# Patient Record
Sex: MALE | Race: WHITE
[De-identification: names, ages, dates, MRNs, and addresses within clinical notes are randomized per-mention and may not be internally consistent; named-entity substitution may affect disease eponyms.]

---

## 2019-09-04 ENCOUNTER — HOSPITAL ENCOUNTER (EMERGENCY)
Dept: HOSPITAL 94 - ER | Age: 64
LOS: 1 days | Discharge: PSYCHIATRIC OR SUBSTANCE USE FACILITY | End: 2019-09-05
Payer: MEDICARE

## 2019-09-04 VITALS — BODY MASS INDEX: 22.78 KG/M2 | WEIGHT: 150.31 LBS | HEIGHT: 68 IN

## 2019-09-04 DIAGNOSIS — F20.9: ICD-10-CM

## 2019-09-04 DIAGNOSIS — E86.0: ICD-10-CM

## 2019-09-04 DIAGNOSIS — Z79.82: ICD-10-CM

## 2019-09-04 DIAGNOSIS — Z79.4: ICD-10-CM

## 2019-09-04 DIAGNOSIS — R62.7: Primary | ICD-10-CM

## 2019-09-04 DIAGNOSIS — F79: ICD-10-CM

## 2019-09-04 DIAGNOSIS — J44.9: ICD-10-CM

## 2019-09-04 DIAGNOSIS — Z79.899: ICD-10-CM

## 2019-09-04 DIAGNOSIS — E11.9: ICD-10-CM

## 2019-09-04 LAB
ALBUMIN SERPL BCP-MCNC: 2.7 G/DL (ref 3.4–5)
ALBUMIN/GLOB SERPL: 0.9 {RATIO} (ref 1.1–1.5)
ALP SERPL-CCNC: 100 IU/L (ref 46–116)
ALT SERPL W P-5'-P-CCNC: 78 U/L (ref 12–78)
ANION GAP SERPL CALCULATED.3IONS-SCNC: 6 MMOL/L (ref 8–16)
AST SERPL W P-5'-P-CCNC: 69 U/L (ref 10–37)
BASOPHILS # BLD AUTO: 0.1 X10'3 (ref 0–0.2)
BASOPHILS NFR BLD AUTO: 0.9 % (ref 0–1)
BILIRUB SERPL-MCNC: 0.5 MG/DL (ref 0.1–1)
BUN SERPL-MCNC: 25 MG/DL (ref 7–18)
BUN/CREAT SERPL: 26.3 (ref 5.4–32)
CALCIUM SERPL-MCNC: 8.1 MG/DL (ref 8.5–10.1)
CHLORIDE SERPL-SCNC: 110 MMOL/L (ref 99–107)
CO2 SERPL-SCNC: 27.8 MMOL/L (ref 24–32)
CREAT SERPL-MCNC: 0.95 MG/DL (ref 0.6–1.1)
EOSINOPHIL # BLD AUTO: 0.2 X10'3 (ref 0–0.9)
EOSINOPHIL NFR BLD AUTO: 3.3 % (ref 0–6)
ERYTHROCYTE [DISTWIDTH] IN BLOOD BY AUTOMATED COUNT: 15.4 % (ref 11.5–14.5)
ETHANOL SERPL-MCNC: < 0.01 GM/DL (ref 0–0.01)
GFR SERPL CREATININE-BSD FRML MDRD: 80 ML/MIN
GLUCOSE SERPL-MCNC: 254 MG/DL (ref 70–104)
HCT VFR BLD AUTO: 47.7 % (ref 42–52)
HGB BLD-MCNC: 15.6 G/DL (ref 14–17.9)
LYMPHOCYTES # BLD AUTO: 1.7 X10'3 (ref 1.1–4.8)
LYMPHOCYTES NFR BLD AUTO: 23.9 % (ref 21–51)
MCH RBC QN AUTO: 28.6 PG (ref 27–31)
MCHC RBC AUTO-ENTMCNC: 32.7 G/DL (ref 33–36.5)
MCV RBC AUTO: 87.7 FL (ref 78–98)
MONOCYTES # BLD AUTO: 0.6 X10'3 (ref 0–0.9)
MONOCYTES NFR BLD AUTO: 8.1 % (ref 2–12)
NEUTROPHILS # BLD AUTO: 4.7 X10'3 (ref 1.8–7.7)
NEUTROPHILS NFR BLD AUTO: 63.8 % (ref 42–75)
PLATELET # BLD AUTO: 145 X10'3 (ref 140–440)
PMV BLD AUTO: 9.5 FL (ref 7.4–10.4)
POTASSIUM SERPL-SCNC: 4.7 MMOL/L (ref 3.5–5.1)
PROT SERPL-MCNC: 5.8 G/DL (ref 6.4–8.2)
RBC # BLD AUTO: 5.44 X10'6 (ref 4.7–6.1)
SODIUM SERPL-SCNC: 144 MMOL/L (ref 135–145)
WBC # BLD AUTO: 7.3 X10'3 (ref 4.5–11)

## 2019-09-04 PROCEDURE — 80053 COMPREHEN METABOLIC PANEL: CPT

## 2019-09-04 PROCEDURE — 96372 THER/PROPH/DIAG INJ SC/IM: CPT

## 2019-09-04 PROCEDURE — 85025 COMPLETE CBC W/AUTO DIFF WBC: CPT

## 2019-09-04 PROCEDURE — 99284 EMERGENCY DEPT VISIT MOD MDM: CPT

## 2019-09-04 PROCEDURE — 80320 DRUG SCREEN QUANTALCOHOLS: CPT

## 2019-09-04 PROCEDURE — 71045 X-RAY EXAM CHEST 1 VIEW: CPT

## 2019-09-04 PROCEDURE — 36415 COLL VENOUS BLD VENIPUNCTURE: CPT

## 2019-09-04 PROCEDURE — 82948 REAGENT STRIP/BLOOD GLUCOSE: CPT

## 2019-09-04 PROCEDURE — 96360 HYDRATION IV INFUSION INIT: CPT

## 2019-09-04 PROCEDURE — 84443 ASSAY THYROID STIM HORMONE: CPT

## 2019-09-05 ENCOUNTER — HOSPITAL ENCOUNTER (INPATIENT)
Dept: HOSPITAL 94 - ADULT MH | Age: 64
LOS: 28 days | Discharge: HOME | DRG: 885 | End: 2019-10-03
Attending: PSYCHIATRY & NEUROLOGY | Admitting: PSYCHIATRY & NEUROLOGY
Payer: MEDICARE

## 2019-09-05 VITALS — DIASTOLIC BLOOD PRESSURE: 69 MMHG | SYSTOLIC BLOOD PRESSURE: 160 MMHG

## 2019-09-05 VITALS — DIASTOLIC BLOOD PRESSURE: 67 MMHG | SYSTOLIC BLOOD PRESSURE: 146 MMHG

## 2019-09-05 VITALS — BODY MASS INDEX: 20.21 KG/M2 | HEIGHT: 68 IN | WEIGHT: 133.38 LBS

## 2019-09-05 VITALS — SYSTOLIC BLOOD PRESSURE: 135 MMHG | DIASTOLIC BLOOD PRESSURE: 67 MMHG

## 2019-09-05 DIAGNOSIS — I10: ICD-10-CM

## 2019-09-05 DIAGNOSIS — N40.0: ICD-10-CM

## 2019-09-05 DIAGNOSIS — L89.159: ICD-10-CM

## 2019-09-05 DIAGNOSIS — Z82.3: ICD-10-CM

## 2019-09-05 DIAGNOSIS — E78.5: ICD-10-CM

## 2019-09-05 DIAGNOSIS — L89.899: ICD-10-CM

## 2019-09-05 DIAGNOSIS — Z91.14: ICD-10-CM

## 2019-09-05 DIAGNOSIS — Z82.5: ICD-10-CM

## 2019-09-05 DIAGNOSIS — Z86.14: ICD-10-CM

## 2019-09-05 DIAGNOSIS — D64.9: ICD-10-CM

## 2019-09-05 DIAGNOSIS — K21.9: ICD-10-CM

## 2019-09-05 DIAGNOSIS — Z79.82: ICD-10-CM

## 2019-09-05 DIAGNOSIS — J32.9: ICD-10-CM

## 2019-09-05 DIAGNOSIS — E44.0: ICD-10-CM

## 2019-09-05 DIAGNOSIS — Z80.9: ICD-10-CM

## 2019-09-05 DIAGNOSIS — Z79.4: ICD-10-CM

## 2019-09-05 DIAGNOSIS — Z79.899: ICD-10-CM

## 2019-09-05 DIAGNOSIS — E11.9: ICD-10-CM

## 2019-09-05 DIAGNOSIS — I25.10: ICD-10-CM

## 2019-09-05 DIAGNOSIS — F33.3: Primary | ICD-10-CM

## 2019-09-05 PROCEDURE — 70551 MRI BRAIN STEM W/O DYE: CPT

## 2019-09-05 PROCEDURE — 85025 COMPLETE CBC W/AUTO DIFF WBC: CPT

## 2019-09-05 PROCEDURE — 80320 DRUG SCREEN QUANTALCOHOLS: CPT

## 2019-09-05 PROCEDURE — 80053 COMPREHEN METABOLIC PANEL: CPT

## 2019-09-05 PROCEDURE — 97530 THERAPEUTIC ACTIVITIES: CPT

## 2019-09-05 PROCEDURE — 97161 PT EVAL LOW COMPLEX 20 MIN: CPT

## 2019-09-05 PROCEDURE — 83036 HEMOGLOBIN GLYCOSYLATED A1C: CPT

## 2019-09-05 PROCEDURE — 71046 X-RAY EXAM CHEST 2 VIEWS: CPT

## 2019-09-05 PROCEDURE — 36415 COLL VENOUS BLD VENIPUNCTURE: CPT

## 2019-09-05 PROCEDURE — 71045 X-RAY EXAM CHEST 1 VIEW: CPT

## 2019-09-05 PROCEDURE — 82948 REAGENT STRIP/BLOOD GLUCOSE: CPT

## 2019-09-05 PROCEDURE — 99285 EMERGENCY DEPT VISIT HI MDM: CPT

## 2019-09-05 PROCEDURE — 87081 CULTURE SCREEN ONLY: CPT

## 2019-09-05 PROCEDURE — 97116 GAIT TRAINING THERAPY: CPT

## 2019-09-05 PROCEDURE — 92508 TX SP LANG VOICE COMM GROUP: CPT

## 2019-09-05 PROCEDURE — 92616 FEES W/LARYNGEAL SENSE TEST: CPT

## 2019-09-05 PROCEDURE — 80061 LIPID PANEL: CPT

## 2019-09-05 PROCEDURE — 84443 ASSAY THYROID STIM HORMONE: CPT

## 2019-09-05 RX ADMIN — INSULIN LISPRO SCH UNITS: 100 INJECTION, SOLUTION INTRAVENOUS; SUBCUTANEOUS at 15:05

## 2019-09-05 RX ADMIN — INSULIN LISPRO SCH UNITS: 100 INJECTION, SOLUTION INTRAVENOUS; SUBCUTANEOUS at 21:21

## 2019-09-05 RX ADMIN — INSULIN GLARGINE SCH UNIT: 100 INJECTION, SOLUTION SUBCUTANEOUS at 21:26

## 2019-09-05 NOTE — NUR
Pt is non contributory with assessment. Pt is cooperative and responds to command, but does 
not answer questions and just looks blankly at RN.

-------------------------------------------------------------------------------

Addendum: 09/05/19 at 2314 by Cristela Hendrix RN

-------------------------------------------------------------------------------

Amended: Links added.

## 2019-09-05 NOTE — NUR
Patient brought over from Main ER Bed 8 to Overflow Bed 24, via stretcher. 
Assisted into bed from stretcher, changed into green gowns, patient incontinent 
and cleaned with warm wipes.

Non-verbal during this time.

## 2019-09-05 NOTE — NUR
Admission note: 



 Pt admitted voluntarily at 1135 for psychosis from the emergency room. Pt has been unable 
to feed himself, or shower self, and not taking his medications or speaking or answering 
questions. Pt was at [facility] with pneumonia. They were unable to get pt on 5150. Pts 
caregivers brought pt here for help with his schizophrenia.  [facility] called and 
will pick pt up when he is discharged. Call Glenis [phone] first and Sonam [phone] 
second. PT HAS HISTORY OF SCHIZOPHRENIA AND DIABETES

## 2019-09-05 NOTE — NUR
Yris Underwood RN, Director of [facility], here to 
evaluate patient for in-patient care.

## 2019-09-05 NOTE — NUR
Patient discharged from Bed 24 in the Overflow area to be admitted to [facility]. Given all personal possessions and transported 
via wheelchair accompanied by Security and staff [place]

## 2019-09-05 NOTE — NUR
Breakfast tray here. Patient fed his meal as he presents as incapable of caring 
for himself. 

Ate 75% of his meal. No facial expression noted throughtout this time.

## 2019-09-05 NOTE — NUR
Nurse note:

   Pt has a sister that is POA for Medical and is in [facility] until Sept 13. Pt was able to 
sign his paperwork.  Pts sister Natalee states he is allowed to sign when he is capable to. 
Pts family understands he goes through these "Spells" and the reason is undetermined. Family 
is aware pt is here and being treated.

## 2019-09-05 NOTE — NUR
[facility] accepted [facility] at [facility] ([facility]). Spencer cornejo 
from [facility] here to have patient sign voluntary forms for admission. Patient 
verbalized understanding of admission to [facility]. He has been there before and 
recognized staff Spencer by name.

## 2019-09-06 VITALS — SYSTOLIC BLOOD PRESSURE: 139 MMHG | DIASTOLIC BLOOD PRESSURE: 58 MMHG

## 2019-09-06 VITALS — DIASTOLIC BLOOD PRESSURE: 63 MMHG | SYSTOLIC BLOOD PRESSURE: 149 MMHG

## 2019-09-06 LAB
CHOLEST SERPL-MCNC: 87 MG/DL (ref 0–200)
CHOLEST/HDLC SERPL: 2.7 {RATIO} (ref 0–4.99)
HBA1C MFR BLD: 9.1 % (ref 4.5–6.2)
HDLC SERPL-MCNC: 32 MG/DL (ref 35–60)
LDLC SERPL DIRECT ASSAY-MCNC: 45 MG/DL (ref 50–100)
TRIGL SERPL-MCNC: 64 MG/DL (ref 20–135)

## 2019-09-06 RX ADMIN — Medication SCH MG: at 07:48

## 2019-09-06 RX ADMIN — INSULIN GLARGINE SCH UNIT: 100 INJECTION, SOLUTION SUBCUTANEOUS at 21:33

## 2019-09-06 RX ADMIN — INSULIN LISPRO SCH UNITS: 100 INJECTION, SOLUTION INTRAVENOUS; SUBCUTANEOUS at 18:30

## 2019-09-06 RX ADMIN — INSULIN LISPRO SCH UNITS: 100 INJECTION, SOLUTION INTRAVENOUS; SUBCUTANEOUS at 09:09

## 2019-09-06 RX ADMIN — Medication SCH MCG: at 07:47

## 2019-09-06 RX ADMIN — THERA TABS SCH EACH: TAB at 07:46

## 2019-09-06 RX ADMIN — PANTOPRAZOLE SODIUM SCH MG: 40 TABLET, DELAYED RELEASE ORAL at 07:47

## 2019-09-06 RX ADMIN — INSULIN LISPRO SCH UNITS: 100 INJECTION, SOLUTION INTRAVENOUS; SUBCUTANEOUS at 14:22

## 2019-09-06 NOTE — NUR
DM consult: Pt with hx T1DM current A1c 9.1. Per H&P pt with a decline in 

self care x 1 week by not eating, drinking, or taking medications per rx. 

Per physical assessment pt is A/O x 2 and nonverbal. Pt with hx catatonic 

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 Pt currently 

not appropriate for DM education at this time. Pt on CHO controlled mech 

soft/chop all diet with 50% PO intake first meal however now up to 100% 

meeting nutrient needs. LBM 9/5. No edema or wounds. Will continue to 

follow.

Recommendations:

1) Continue mech soft/chop all CHO controlled diet

2) DM education prior to discharge IF appropriate

3) Routine bowel care

4) Weekly wt

-------------------------------------------------------------------------------

Addendum: 09/06/19 at 1509 by Jenn Sellers RD

-------------------------------------------------------------------------------

Amended: Links added.

## 2019-09-06 NOTE — NUR
NURSING PROGRESS REPORT:

Legal hold: Voluntary

Client on voluntary status for GD.

Report received from PITO Gabriel with use of SBAR.

Why are they here:  Pt. admitted for a mental health evaluation. His staff of the [facility] residence he lives at in [place] stated Pt. has been refusing to care for himself 
for the past week. According to staff, Pt. has not been eating or drinking, and has not been 
taking his medications. Per staff, Pt. has schizophrenia and has gone into catatonic states 
in the past associated with his diabetes. Pt. was seen at [facility] prior to this 
admission and prescribed antibiotics for possible pneumonia. 

Assessment

What has happened this shift: 

Pt. sleeping at start of shift. Pt. woken up for blood glucose test. Pt.'s CBG were , 
Noon 345, and dinner 253. Pt. is level 6 hyperglycemia protocol. 1:1 done at bedside. Pt. is 
developmentally delayed. Pt. cooperative but gives minimal responses. Pt. took all 
medications and ate all meals in community room. Pt. needs much prompting for meals and 
sometimes requires staff to feed him. Pt. is incontinent and needs assistance toileting. 

S/I, H/I: unable to assess

A/VH:  Unable to assess

Sleep: Pt. napped x2 today. 

ADL's: Needs assistance. Pt uses a FWW, incontinent of stool

Group attendance: Afternoon group. 

Were meds taken: Medication compliant.

Any med S/E: None reported or observed.

Mental Status Exam

Appearance: Disheveled, wearing green unit scrubs

Eye contact: Poor

Behavior: Cooperative, delayed

Speech: Minimal

Mood: Unable to assess

Affect: Catatonic

Thought process: Unable to assess

Thought Content: Unable to assess

Cognition: Alert

Insight: Unable to assess

Judgment: Unable to assess

Interventions

PRN's used: None

Therapeutic interventions: 1:1 therapeutic assessment, medication 
administration/education/monitoring, provided clear/simple instructions, Q15 min safety 
checks.

Restraints/seclusion/emergency medication: N/A

Justification of Continued Inpatient Treatment: Pt is gravely disabled and needs therapeutic 
support and medication management to provide stablization.

## 2019-09-06 NOTE — NUR
NURSING PROGRESS REPORT:





Legal hold: Voluntary



Client on voluntary status for GD.



Report received from PITO Gabriel with use of SBAR.



Why are they here:  Pt. admitted for a mental health evaluation. His staff of the [facility] residence he lives at in [place] stated Mr. Patton has been refusing to care for 
himself for the past week. According to staff, Mr. Patton has not been eating or drinking, 
and has not been taking his medications. Per staff, Mr. Patton has schizophrenia and has 
gone into catatonic states in the past associated with his diabetes. Mr. Patton was seen at 
[facility] prior to this admission and prescribed antibiotics for possible pneumonia. 



Assessment



What has happened this shift: Pt was sitting in group room eating his dinner at shift 
change. This writer introduced self and attempted to establish rapport. Pt presents as 
possible agitated or scared.  Pt is cooperative, non verbal, but  responds to commands and 
accepting of direction or redirection;  however does not respond to questions.  Pt  stares 
blankly at this writer. Pt is able to feed self, but needs assistance in cutting up meat. Pt 
is insulin dependent diabetic. Pt finished his dinner around 2000, then was escorted back to 
his room. Pt's blood glucose at 1705 was  331; HS blood glucose was 381. Per diabetic 
protocol Level 3, pt was administered 15 units of Humalog and pt was started at 12 units of 
Lantus per admin protocol. GFR was 80.  1:1 assessment was completed at bedside, pt was 
cooperative, again pt did not answer questions.  Pt is incontinent x2. Brief was changed 
prior to bed by ELIZABETH West. Pt uses a FWW. Pt is medication compliant.  Pt's IV was 
discontinued, catheter was intact. 



S/I, H/I: Unable to assess

A/VH:  Unable to assess.

Sleep: Currently sleeping. See sleep assessment notation.

ADL's: Needs assistance. Pt uses a FWW, incontinent of stool

Group attendance: Night shift, no group.

Were meds taken: Medication compliant.

Any med S/E: None reported or observed.





Mental Status Exam



Appearance: Disheveled, wearing green unit scrubs, pt looks older then stated age.

Eye contact: Poor

Behavior: Cooperative, delayed

Speech: Non verbal

Mood: Unable to assess

Affect: Catatonic

Thought process: Unable to assess

Thought Content: Unable to assess

Cognition: Alert

Insight: Unable to assess

Judgment: Unable to assess







Interventions



PRN's used: None



Therapeutic interventions: 1:1 therapeutic assessment, medication 
administration/education/monitoring, provided clear/simple instructions, Q15 min safety 
checks.



Restraints/seclusion/emergency medication: N/A





Justification of Continued Inpatient Treatment: Pt is gravely disabled and needs therapeutic 
support and medication management to provide stablization.

## 2019-09-07 VITALS — SYSTOLIC BLOOD PRESSURE: 154 MMHG | DIASTOLIC BLOOD PRESSURE: 58 MMHG

## 2019-09-07 VITALS — DIASTOLIC BLOOD PRESSURE: 66 MMHG | SYSTOLIC BLOOD PRESSURE: 150 MMHG

## 2019-09-07 RX ADMIN — INSULIN GLARGINE SCH UNIT: 100 INJECTION, SOLUTION SUBCUTANEOUS at 22:26

## 2019-09-07 RX ADMIN — PANTOPRAZOLE SODIUM SCH MG: 40 TABLET, DELAYED RELEASE ORAL at 07:35

## 2019-09-07 RX ADMIN — INSULIN LISPRO SCH UNITS: 100 INJECTION, SOLUTION INTRAVENOUS; SUBCUTANEOUS at 14:13

## 2019-09-07 RX ADMIN — DIVALPROEX SODIUM SCH MG: 250 TABLET, EXTENDED RELEASE ORAL at 08:05

## 2019-09-07 RX ADMIN — INSULIN LISPRO SCH UNITS: 100 INJECTION, SOLUTION INTRAVENOUS; SUBCUTANEOUS at 18:20

## 2019-09-07 RX ADMIN — THERA TABS SCH EACH: TAB at 07:32

## 2019-09-07 RX ADMIN — Medication SCH MG: at 07:32

## 2019-09-07 RX ADMIN — INSULIN LISPRO SCH UNITS: 100 INJECTION, SOLUTION INTRAVENOUS; SUBCUTANEOUS at 08:57

## 2019-09-07 RX ADMIN — Medication SCH MCG: at 07:35

## 2019-09-07 RX ADMIN — Medication PRN G: at 21:08

## 2019-09-07 NOTE — NUR
NURSING PROGRESS REPORT:





Legal hold: Voluntary



Client on voluntary status for GD.



Report received from PITO Gabriel with use of SBAR.



Why are they here:  Pt. admitted for a mental health evaluation. His staff of the [facility] residence he lives at in [place] stated Mr. Patton has been refusing to care for 
himself for the past week. According to staff, Mr. Patton has not been eating or drinking, 
and has not been taking his medications. Per staff, Mr. Patton has schizophrenia and has 
gone into catatonic states in the past associated with his diabetes. Mr. Patton was seen at 
[facility] prior to this admission and prescribed antibiotics for possible pneumonia. 



Assessment



What has happened this shift: Pt sitting in group room finishing his dinner at shift change. 
Pt appears to be less angry and is more verbal. When asked if pt remembered nurse from 
yesterday he nodded yes. Pt does answer questions with minimal, delayed responses. Pt's 
continues to be cooperative. 1:1 assessment was completed at bedside. Pt is on Level 6 
insulin protocol. Pt's HS blood glucose was 221. Due to Lantus protocol hospitalist was 
paged about increase of insulin. Per Dr. Marie pt was to get 14 units of Lantus. Pt 
medication compliant. Pt is incontinent x2 and needs assistance toileting. Pt's brief was 
changed prior to going to bed, currently sleeping with distress noted. 





S/I, H/I: Unable to assess

A/VH:  Unable to assess.

Sleep: Currently sleeping. See sleep assessment notation.

ADL's: Needs assistance. Pt uses a FWW, incontinent x2

Group attendance: Night shift, no group.

Were meds taken: Medication compliant.

Any med S/E: None reported or observed.





Mental Status Exam



Appearance: Disheveled, wearing green unit scrubs, pt looks older then stated age.

Eye contact: Poor

Behavior: Cooperative, delayed

Speech: Soft, minimal

Mood: Unable to assess

Affect: Catatonic

Thought process: Unable to assess

Thought Content: Unable to assess

Cognition: Alert

Insight: Unable to assess

Judgment: Unable to assess







Interventions



PRN's used: None



Therapeutic interventions: 1:1 therapeutic assessment, medication 
administration/education/monitoring, provided clear/simple instructions, Q15 min safety 
checks.



Restraints/seclusion/emergency medication: N/A





Justification of Continued Inpatient Treatment: Pt is gravely disabled and needs therapeutic 
support and medication management to provide stablization.

-------------------------------------------------------------------------------

Addendum: 09/07/19 at 0126 by Cristela Hendrix RN

-------------------------------------------------------------------------------

Pt's A1C was 9.1. Pt prescribed 6mg Melatonin HS, tables come in 3mg only. Will need to have 
RX adjusted. Will endorse to day shift. 

-------------------------------------------------------------------------------

Addendum: 09/07/19 at 0546 by Cristela Hendrix RN

-------------------------------------------------------------------------------

Encouraged fluids during encounters.

## 2019-09-07 NOTE — NUR
NURSING PROGRESS REPORT:

Legal hold: Voluntary

Client on voluntary status for GD.

Report received from PITO Gabriel with use of SBAR.

Why are they here:  Pt. admitted for a mental health evaluation. His staff of the [facility] residence he lives at in [place] stated Mr. Patton has been refusing to care for 
himself for the past week. According to staff, Mr. Patton has not been eating or drinking, 
and has not been taking his medications. Per staff, Mr. Patton has schizophrenia and has 
gone into catatonic states in the past associated with his diabetes. Mr. Patton was seen at 
[facility] prior to this admission and prescribed antibiotics for possible pneumonia. 

Assessment

What has happened this shift: 

Pt. sleeping at start of shift. Pt. woken up for AM CBG and medications. BG today were 224, 
256, 135. Pt. is level 6 on hyperglycemic protocol. Pt. took all medications. Pt. walked to 
community room for breakfast with aid of walker. Pt. ate his breakfast by himself within an 
hour. Pt. is on hyperglycemic protocol level 6. Pt. given sliding scale humolog. Pt.'s 
Lantus increased to 18 units QHS. Pt. is incontinent of bowel and bladder. Pt. needs 
prompting for toileting Pt. showered today. Pt. encouraged with po fluids. Pt. napped x2. 
Pt. will feed himself with prompting. 

S/I, H/I: Unable to assess

A/VH:  Unable to assess.

Sleep: Currently sleeping. See sleep assessment notation.

ADL's: Needs assistance. Pt uses a FWW, incontinent x1

Group attendance: Yes

Were meds taken: Yes

Any med S/E: None reported or observed.

Mental Status Exam

Appearance: Disheveled, wearing green unit scrubs,

Eye contact: Poor

Behavior: Cooperative, delayed

Speech: Soft, minimal, delayed

Mood: Unable to assess

Affect: blunted

Thought process: Unable to assess

Thought Content: Unable to assess

Cognition: A&O to self and place (knew he was in [place])

Insight: Unable to assess

Judgment: Unable to assess

Interventions

PRN's used: None

Therapeutic interventions: 1:1 therapeutic assessment, medication 
administration/education/monitoring, provided clear/simple instructions, Q15 min safety 
checks.

Restraints/seclusion/emergency medication: N/A

Justification of Continued Inpatient Treatment: Pt is gravely disabled and needs therapeutic 
support and medication management to provide stablization.

## 2019-09-08 VITALS — SYSTOLIC BLOOD PRESSURE: 144 MMHG | DIASTOLIC BLOOD PRESSURE: 72 MMHG

## 2019-09-08 VITALS — SYSTOLIC BLOOD PRESSURE: 167 MMHG | DIASTOLIC BLOOD PRESSURE: 77 MMHG

## 2019-09-08 RX ADMIN — DIVALPROEX SODIUM SCH MG: 250 TABLET, EXTENDED RELEASE ORAL at 07:35

## 2019-09-08 RX ADMIN — Medication SCH MCG: at 07:30

## 2019-09-08 RX ADMIN — INSULIN LISPRO SCH UNITS: 100 INJECTION, SOLUTION INTRAVENOUS; SUBCUTANEOUS at 09:17

## 2019-09-08 RX ADMIN — INSULIN LISPRO SCH UNITS: 100 INJECTION, SOLUTION INTRAVENOUS; SUBCUTANEOUS at 14:10

## 2019-09-08 RX ADMIN — PANTOPRAZOLE SODIUM SCH MG: 40 TABLET, DELAYED RELEASE ORAL at 07:30

## 2019-09-08 RX ADMIN — INSULIN GLARGINE SCH UNIT: 100 INJECTION, SOLUTION SUBCUTANEOUS at 21:00

## 2019-09-08 RX ADMIN — THERA TABS SCH EACH: TAB at 07:30

## 2019-09-08 RX ADMIN — Medication SCH MG: at 07:29

## 2019-09-08 NOTE — NUR
NURSING PROGRESS REPORT:

Legal hold: Voluntary

Client on voluntary status for GD.

Report received from PITO Gabriel with use of SBAR.

Why are they here:  Pt. admitted for a mental health evaluation. His staff of the [facility] residence he lives at in [place] stated Mr. Patton has been refusing to care for 
himself for the past week. According to staff, Mr. Patton has not been eating or drinking, 
and has not been taking his medications. Per staff, Mr. Patton has schizophrenia and has 
gone into catatonic states in the past associated with his diabetes. Mr. Patton was seen at 
[facility] prior to this admission and prescribed antibiotics for possible pneumonia. 

Assessment

What has happened this shift: 

Louise was found in the group room at change of shift.  He is mostly nonverbal.  He did 
answer several questions with yes or no that were closed ended questions.  He ate all of the 
food that was served to him although it took him an extraordinarily long time to finish his 
food.   He ambulates well using a walker.  He is able to swallow his medications.  He offers 
his finger for blood glucose checks.  Blood sugars were 65 @HS and lantus was held.  he 
continues to be a level 6.  

S/I, H/I: Unable to assess

A/VH:  Unable to assess.

Sleep: Currently sleeping. See sleep assessment notation.

ADL's: Needs assistance. Pt uses a FWW, incontinent x1

Group attendance: Yes

Were meds taken: Yes

Any med S/E: None reported or observed.

Mental Status Exam

Appearance: Disheveled, wearing green unit scrubs,

Eye contact: Poor

Behavior: Cooperative, delayed

Speech: Soft, minimal, delayed

Mood: Unable to assess

Affect: blunted

Thought process: Unable to assess

Thought Content: Unable to assess

Cognition: A&O to self and place (knew he was in [place])

Insight: Unable to assess

Judgment: Unable to assess

Interventions

PRN's used: None

Therapeutic interventions: 1:1 therapeutic assessment, medication 
administration/education/monitoring, provided clear/simple instructions, Q15 min safety 
checks.

Restraints/seclusion/emergency medication: N/A

Justification of Continued Inpatient Treatment: Pt is gravely disabled and needs therapeutic 
support and medication management to provide stablization.

## 2019-09-08 NOTE — NUR
NURSING PROGRESS REPORT:

Legal hold: Voluntary

Client on voluntary status for GD.



Report received from ZACHERY Gabriel with use of SBAR.



Why are they here:  Pt. admitted for a mental health evaluation. His staff of the [facility] residence he lives at in [place] stated Mr. Patton has been refusing to care for 
himself for the past week. According to staff, Mr. Patton has not been eating or drinking, 
and has not been taking his medications. Per staff, Mr. Patton has schizophrenia and has 
gone into catatonic states in the past associated with his diabetes. Mr. Patton was seen at 
[facility] prior to this admission and prescribed antibiotics for possible pneumonia. 



Assessment



What has happened this shift: 

Patient in group room finishing dinner at the beginning of shift. Patient also consumed HS 
snack in the group room. Patient is a level six per insulin protocol. Initial HS BS 70 at 
2045, CRN aware and glucose shot provided at 2110, 2135 patient  and HS Lantus 
provided per order. Later in the shift while patient at rest his BS recheck 133.



Patient was vocally nonresponsive to all assessment questions and replied "yes" or "no" to a 
few questions but most of the shift patient non-vocal. Patient would look in the direction 
of this writer when being talked to and when coached through an action showed understanding 
through body language. Such as, this writer explained his pills and he held them in his 
hand, looked at them, then took them. 



S/I, H/I: Unable to assess

A/VH:  Unable to assess.

Sleep: asleep at this time

ADL's: Needs assistance. 

Group attendance: no groups this shift

Were meds taken: Yes

Any med S/E: None reported or observed.



Mental Status Exam

Appearance: Disheveled, wearing green unit scrubs

Eye contact: Poor

Behavior: Cooperative, delayed

Speech: Soft, minimal, delayed

Mood: Unable to assess

Affect: blunted

Thought process: Unable to assess

Thought Content: Unable to assess

Cognition: responsive to name, didn't answer questions from this writer

Insight: Unable to assess

Judgment: Unable to assess



Interventions

PRN's used: PO glucose per protocol, effective



Therapeutic interventions: 1:1 therapeutic assessment, medication 
administration/education/monitoring, provided clear/simple instructions, Q15 min safety 
checks.

Restraints/seclusion/emergency medication: N/A

Justification of Continued Inpatient Treatment: Pt is gravely disabled and needs therapeutic 
support and medication management to provide stablization.

## 2019-09-08 NOTE — NUR
NURSING PROGRESS REPORT:

Legal hold: Voluntary

Client on voluntary status for GD.

Report received from Teresita Garcia RN with use of SBAR.

Why are they here:  Pt. admitted for a mental health evaluation. His staff of the [facility] residence he lives at in [place] stated Mr. Patton has been refusing to care for 
himself for the past week. According to staff, Mr. Patton has not been eating or drinking, 
and has not been taking his medications. Per staff, Mr. Patton has schizophrenia and has 
gone into catatonic states in the past associated with his diabetes. Mr. Patton was seen at 
[facility] prior to this admission and prescribed antibiotics for possible pneumonia. 

Assessment

What has happened this shift: 

Louise was found in the group room at change of shift.  He is mostly nonverbal.  He did 
answer several questions with yes or no that were closed ended questions.  He ate all of the 
food that was served to him although it took him an extraordinarily long time to finish his 
food.  He required some prompting and cutting of his food into smaller bites.  He ambulates 
well using a walker.  He is able to swallow his medications.  He offers his finger for blood 
glucose checks.  Blood sugars were 144 and 331.  he continues to be a level 6.  

S/I, H/I: Unable to assess

A/VH:  Unable to assess.

Sleep: Currently sleeping. See sleep assessment notation.

ADL's: Needs assistance. Pt uses a FWW, incontinent x1

Group attendance: Yes

Were meds taken: Yes

Any med S/E: None reported or observed.

Mental Status Exam

Appearance: Disheveled, wearing green unit scrubs,

Eye contact: Poor

Behavior: Cooperative, delayed

Speech: Soft, minimal, delayed

Mood: Unable to assess

Affect: blunted

Thought process: Unable to assess

Thought Content: Unable to assess

Cognition: A&O to self and place (knew he was in [place])

Insight: Unable to assess

Judgment: Unable to assess

Interventions

PRN's used: None

Therapeutic interventions: 1:1 therapeutic assessment, medication 
administration/education/monitoring, provided clear/simple instructions, Q15 min safety 
checks.

Restraints/seclusion/emergency medication: N/A

Justification of Continued Inpatient Treatment: Pt is gravely disabled and needs therapeutic 
support and medication management to provide stablization.

## 2019-09-09 VITALS — DIASTOLIC BLOOD PRESSURE: 53 MMHG | SYSTOLIC BLOOD PRESSURE: 154 MMHG

## 2019-09-09 VITALS — DIASTOLIC BLOOD PRESSURE: 76 MMHG | SYSTOLIC BLOOD PRESSURE: 174 MMHG

## 2019-09-09 RX ADMIN — INSULIN LISPRO SCH UNITS: 100 INJECTION, SOLUTION INTRAVENOUS; SUBCUTANEOUS at 19:17

## 2019-09-09 RX ADMIN — Medication SCH MCG: at 07:20

## 2019-09-09 RX ADMIN — PANTOPRAZOLE SODIUM SCH MG: 40 TABLET, DELAYED RELEASE ORAL at 07:20

## 2019-09-09 RX ADMIN — INSULIN GLARGINE SCH UNIT: 100 INJECTION, SOLUTION SUBCUTANEOUS at 21:05

## 2019-09-09 RX ADMIN — INSULIN LISPRO SCH UNITS: 100 INJECTION, SOLUTION INTRAVENOUS; SUBCUTANEOUS at 09:10

## 2019-09-09 RX ADMIN — Medication SCH MG: at 07:19

## 2019-09-09 RX ADMIN — THERA TABS SCH EACH: TAB at 07:20

## 2019-09-09 RX ADMIN — DIVALPROEX SODIUM SCH MG: 250 TABLET, EXTENDED RELEASE ORAL at 09:11

## 2019-09-09 NOTE — NUR
NURSING PROGRESS REPORT: Pooja

Legal hold: Voluntary

Client on voluntary status for GD.

Report received from PITO Brunner with use of SBAR.

Why are they here:  Pt. admitted for a mental health evaluation. His staff of the [facility] residence he lives at in [place] stated Mr. Patton has been refusing to care for 
himself for the past week. According to staff, Mr. Patton has not been eating or drinking, 
and has not been taking his medications. Per staff, Mr. Patton has schizophrenia and has 
gone into catatonic states in the past associated with his diabetes. Mr. Patton was seen at 
[facility] prior to this admission and prescribed antibiotics for possible pneumonia. 

Assessment

What has happened this shift: 

Patient was in bed to begin this shift. After assessment, client refused am finger stick. He 
said, "no" and pulled his hand back when asked to conduct finger stick. Client was mute the 
rest of the assessment. Writer was able to obtain a FS and it was 344, Currently (0844 
hours) waiting for client to finish am meal so proper insulin dose can be given. Client is 
able to self feed but would do much better if he  was assisted while eating. Client was 
given his Insulin per orders at 0905. Correctional dose was 28 units and Nutritional 
adjustment was for 18 additional units for a total of 46 units delivered in left arm. 
Compliant with medications and consumed majority of breakfast meal. Client was assisted back 
to his room after breakfast where he rested in bed with eyes closed .Respirations unlabored. 
No signs /symptoms of hyper/hypo glycemia noted. Client was prompted to come to Group room 
for lunch, Client was having a difficult time feeding himself so he was assisted by this 
writer and he consumed 100% of his lunch. Client elected to participate in afternoon group 
and tolerated the group activities well. Client did refuse FS at noon and was adament that 
procedure would not be conducted, (forcefully pulled away). He was assessed frequently this 
afternoon after refusal and shows no S/S of hyper/hypo glycemia. Charge Nurse aware.

S/I, H/I: Unable to assess

A/VH:  Unable to assess.

Sleep: Rested during shift as needed.

ADL's: Needs assistance and prompts.

Group attendance: Yes

Were meds taken: Yes

Any med S/E: None reported or observed.

Mental Status Exam

Appearance: Disheveled, wearing green unit scrubs,

Eye contact: Poor

Behavior: Cooperative, delayed

Speech: Soft, minimal, delayed

Mood: Unable to assess

Affect: blunted

Thought process: Unable to assess

Thought Content: Unable to assess

Cognition: A&O to self and place (knew he was in [place])

Insight: Unable to assess

Judgment: Unable to assess

Interventions

PRN's used: None

Therapeutic interventions: 1:1 therapeutic assessment, medication 
administration/education/monitoring, provided clear/simple instructions, Q15 min safety 
checks.

Restraints/seclusion/emergency medication: N/A

Justification of Continued Inpatient Treatment: Pt is gravely disabled and needs therapeutic 
support and medication management to provide stablization. 

-------------------------------------------------------------------------------

Addendum: 09/09/19 at 1702 by Marcos Maier RN

-------------------------------------------------------------------------------

Client permitted a FS at about 1645 hours. Result was 335. Client refused FS at noon meal.

-------------------------------------------------------------------------------

Addendum: 09/09/19 at 1801 by Marcos Maier RN

-------------------------------------------------------------------------------

Fs at 1730 was 348. Info was passed to Harvey BINGHAM as well as Antwan who has this patient over 
the night shift.

-------------------------------------------------------------------------------

Addendum: 09/09/19 at 1812 by Marcos Maier RN

-------------------------------------------------------------------------------

Client was incontinent of urine before evening meal. Client was cleaned, dressed and 
escorted to dinner in the Group room.

## 2019-09-09 NOTE — NUR
Contacted P Dr Dwyer regarding patients blood sugar of 462, ordered 18 u Regular now and 
also administer patients scheduled dose of Lantus and recheck blood sugar in 6 hours.  

-------------------------------------------------------------------------------

Addendum: 09/09/19 at 2105 by Cornelia Beach RN

-------------------------------------------------------------------------------

 Clarification: Order was for Humalog Lispro 18 U now.

## 2019-09-10 VITALS — DIASTOLIC BLOOD PRESSURE: 70 MMHG | SYSTOLIC BLOOD PRESSURE: 152 MMHG

## 2019-09-10 VITALS — SYSTOLIC BLOOD PRESSURE: 120 MMHG | DIASTOLIC BLOOD PRESSURE: 68 MMHG

## 2019-09-10 RX ADMIN — THERA TABS SCH EACH: TAB at 07:25

## 2019-09-10 RX ADMIN — PANTOPRAZOLE SODIUM SCH MG: 40 TABLET, DELAYED RELEASE ORAL at 07:24

## 2019-09-10 RX ADMIN — DIVALPROEX SODIUM SCH MG: 250 TABLET, EXTENDED RELEASE ORAL at 08:46

## 2019-09-10 RX ADMIN — INSULIN LISPRO SCH UNITS: 100 INJECTION, SOLUTION INTRAVENOUS; SUBCUTANEOUS at 19:27

## 2019-09-10 RX ADMIN — Medication SCH MCG: at 07:25

## 2019-09-10 RX ADMIN — Medication SCH MG: at 07:24

## 2019-09-10 RX ADMIN — INSULIN LISPRO SCH UNITS: 100 INJECTION, SOLUTION INTRAVENOUS; SUBCUTANEOUS at 14:31

## 2019-09-10 RX ADMIN — INSULIN GLARGINE SCH UNIT: 100 INJECTION, SOLUTION SUBCUTANEOUS at 20:51

## 2019-09-10 RX ADMIN — AMOXICILLIN AND CLAVULANATE POTASSIUM SCH TAB: 875; 125 TABLET, FILM COATED ORAL at 17:49

## 2019-09-10 NOTE — NUR
NURSING PROGRESS REPORT

Legal hold: Voluntary

Client on voluntary status for GD.

Report received from PITO Gabriel with use of SBAR.

Why are they here:  Pt. admitted for a mental health evaluation. His staff of the [facility] residence he lives at in [place] stated Mr. Patton has been refusing to care for 
himself for the past week. According to staff, Mr. Patton has not been eating or drinking, 
and has not been taking his medications. Per staff, Mr. Patton has schizophrenia and has 
gone into catatonic states in the past associated with his diabetes. Mr. Patton was seen at 
[facility] prior to this admission and prescribed antibiotics for possible pneumonia. 

Assessment

What has happened this shift: 

Pt spent evening sitting up in Dining room. Pt was calm with constricted affect. Pt took 
several minutes to answer questions but would answer yes or no. Pt is mostly non verbal at 
this time. Pts  BG was 348 at dinner and pt ate approx 15carbs, pt was given 27 units of 
insulin per protocol. Evening BG was 462, hospitalist was contacted and we were instructed 
to give 18U of regular insulin and 18 units of Lantus and recheck BG 6 hours later. Pts BG 
was 56. Pt was given a snack and recheckd in 15 min to BG of 108. Pt is currently 
asymptomatic and resting comfortably. Pt

S/I, H/I: Unable to assess

A/VH:  Unable to assess.

Sleep: Pt sleeping well 

ADL's: Needs assistance and prompts.

Group attendance: Yes

Were meds taken: Yes

Any med S/E: None reported or observed.

Mental Status Exam

Appearance: Disheveled, wearing green unit scrubs,

Eye contact: Fair 

Behavior: Cooperative, delayed

Speech: Soft, minimal, delayed

Mood: Unable to assess

Affect: blunted

Thought process: Unable to assess

Thought Content: Unable to assess

Cognition: A&O to self 

Insight: Unable to assess

Judgment: Unable to assess

Interventions

PRN's used: None

Therapeutic interventions: 1:1 therapeutic assessment, medication 
administration/education/monitoring, provided clear/simple instructions, Q15 min safety 
checks.

Restraints/seclusion/emergency medication: N/A

Justification of Continued Inpatient Treatment: Pt is gravely disabled and needs therapeutic 
support and medication management to provide stablization.

## 2019-09-10 NOTE — NUR
NURSING PROGRESS REPORT: 

Legal hold: Voluntary

Client on voluntary status for GD.

Report received from PITO Gabriel with use of SBAR.

Why are they here:  Pt. admitted for a mental health evaluation. His staff of the [facility] residence he lives at in [place] stated Mr. Patton has been refusing to care for 
himself for the past week. According to staff, Mr. Patton has not been eating or drinking, 
and has not been taking his medications. Per staff, Mr. Patton has schizophrenia and has 
gone into catatonic states in the past associated with his diabetes. Mr. Patton was seen at 
[facility] prior to this admission and prescribed antibiotics for possible pneumonia. 



Assessment



What has happened this shift: 



Pt was sitting alone in the break room at change of shift with a glass of lemon juice in his 
hand. Pt did not seem able to continue drinking the juice on his own without assistance. 
Removed cup from patients hand. Pts BG was noted to be 182 prior to dinner.  Pt was given 15 
units of Regular insulin to cover dinner meal. Pt is mostly non verbal. Spoke to pt about 
his family and he smiled and said yes but did not speak further. Pt was assisted to his room 
for toileting and spent remainder of the evening in his room before going to bed. Pts HS BG 
was 76. Lantus given. Pt was offered a snack but he refused. Pt was toileted on schedule and 
assisted to bed. 



S/I, H/I: Unable to assess

A/VH:  REKHA

Sleep: sleeping well 

ADL's: Needs assistance and prompts. Pt is able to stand up from the toilet w/prompting. 

Group attendance: Yes

Were meds taken: Yes

Any med S/E: None reported or observed.

Mental Status Exam

Appearance: Disheveled, wearing green unit scrubs,

Eye contact: Fair 

Behavior: Cooperative, delayed

Speech: Soft, minimal, delayed

Mood: Unable to assess

Affect: blunted

Thought process: Unable to assess

Thought Content: Unable to assess

Cognition: A&O to self 

Insight: Unable to assess

Judgment: Unable to assess

Interventions

PRN's used: None



Therapeutic interventions: 1:1 therapeutic assessment, medication 
administration/education/monitoring, provided clear/simple instructions, Q15 min safety 
checks.

Restraints/seclusion/emergency medication: N/A

Justification of Continued Inpatient Treatment: Pt is gravely disabled and needs therapeutic 
support and medication management to provide stabilization and encourage independence.

## 2019-09-10 NOTE — NUR
Group Art Therapy (Continued)  Patient was in the group room as the session began. This was 
his 2nd art therapy group the second day in a row. Pt.did not express an interest in 
remaining in the group, however, was encouraged to stay and enjoy watching the video 
landscape with relaxation music. Pt. was not interested in the assigned activity, as his 
functional level was impaired to complete this task. Patient was provided with an individual 
project using the 'Kinetic Sandtray'. 

Pt. was shown how he could interact with the sand. Pt. chose NOT to utilize this activity. 
Pt. was then shown by this 

therapist, how to do a hand tracing. Patient, with prompting, WAS able to follow this visual 
demonstration, and put his hand (timidly at first) on he table/paper allowing therapist to 
trace.  Names were put on each hand 

drawing, (his and this therapists') with the word "Hi!" in between each pair of hands and 
hung on the wall.  

Pt. was able to follow with his eyes as this was done.  Pt. did not verbalize during 

session. He was assisted by a tech back to his room at the end of the session.



Kaye Kiser MA, LMFT #[id number]

[facility] Art Therapsist


-------------------------------------------------------------------------------

Addendum: 09/10/19 at 1616 by Kaye Kiser SS

-------------------------------------------------------------------------------

Amended: Links added.

## 2019-09-10 NOTE — NUR
Spoke to Pt's sister, Natalee. She states that she will be up in the [place] area tomorrow. 
She states that her  was ill so it delayed their travels here. Informed Pt of this.

## 2019-09-10 NOTE — NUR
NURSING PROGRESS REPORT: 

Legal hold: Voluntary

Client on voluntary status for GD.

Report received from PITO Gabriel with use of SBAR.

Why are they here:  Pt. admitted for a mental health evaluation. His staff of the [facility] residence he lives at in [place] stated Mr. Patton has been refusing to care for 
himself for the past week. According to staff, Mr. Patton has not been eating or drinking, 
and has not been taking his medications. Per staff, Mr. Patton has schizophrenia and has 
gone into catatonic states in the past associated with his diabetes. Mr. Patton was seen at 
[facility] prior to this admission and prescribed antibiotics for possible pneumonia. 



Assessment



What has happened this shift: 



Pt was resting in bed peacefully at change of shift. Pt was calm with constricted affect. Pt 
was pleasant and cooperative with care. He is incontinent of B&B if not prompted. He was 
soiled at change of shift and Pt was toileted, given a sponge bath, and fresh green scrubs. 
No skin breakdown noted. Morning BS was 88. Pt is mostly non verbal at this time but does 
answer some questions with one or two word answers. He needs prompting, but is able to 
perform some ADLs on his own. He is up for all meals and groups in the community room and 
did not nap this shift. It was noted by this nurse that he does not like to be touched and 
recoils often. Spoke to his sister, Natalee, she states that he is one of nine children and 
has a female twin named Cyndee. He was seen interacting with peers in art therapy and smiling 
and talking to unit nurse and unit tech during toileting. He ambulated self with some 
prompting without walker. 



S/I, H/I: Unable to assess

A/VH:  "no"

Sleep: up this shift 

ADL's: Needs assistance and prompts.

Group attendance: Yes

Were meds taken: Yes

Any med S/E: None reported or observed.

Mental Status Exam

Appearance: Disheveled, wearing green unit scrubs,

Eye contact: Fair 

Behavior: Cooperative, delayed

Speech: Soft, minimal, delayed

Mood: Unable to assess

Affect: blunted

Thought process: Unable to assess

Thought Content: Unable to assess

Cognition: A&O to self 

Insight: Unable to assess

Judgment: Unable to assess

Interventions

PRN's used: None



Therapeutic interventions: 1:1 therapeutic assessment, medication 
administration/education/monitoring, provided clear/simple instructions, Q15 min safety 
checks.

Restraints/seclusion/emergency medication: N/A

Justification of Continued Inpatient Treatment: Pt is gravely disabled and needs therapeutic 
support and medication management to provide stabilization and encourage independence.

## 2019-09-11 VITALS — DIASTOLIC BLOOD PRESSURE: 83 MMHG | SYSTOLIC BLOOD PRESSURE: 168 MMHG

## 2019-09-11 VITALS — DIASTOLIC BLOOD PRESSURE: 69 MMHG | SYSTOLIC BLOOD PRESSURE: 151 MMHG

## 2019-09-11 RX ADMIN — AMOXICILLIN AND CLAVULANATE POTASSIUM SCH TAB: 875; 125 TABLET, FILM COATED ORAL at 17:07

## 2019-09-11 RX ADMIN — THERA TABS SCH EACH: TAB at 07:41

## 2019-09-11 RX ADMIN — DIVALPROEX SODIUM SCH MG: 250 TABLET, EXTENDED RELEASE ORAL at 07:46

## 2019-09-11 RX ADMIN — INSULIN LISPRO SCH UNITS: 100 INJECTION, SOLUTION INTRAVENOUS; SUBCUTANEOUS at 09:26

## 2019-09-11 RX ADMIN — Medication SCH MMU: at 20:53

## 2019-09-11 RX ADMIN — PANTOPRAZOLE SODIUM SCH MG: 40 TABLET, DELAYED RELEASE ORAL at 07:41

## 2019-09-11 RX ADMIN — AMOXICILLIN AND CLAVULANATE POTASSIUM SCH TAB: 875; 125 TABLET, FILM COATED ORAL at 07:46

## 2019-09-11 RX ADMIN — INSULIN GLARGINE SCH UNIT: 100 INJECTION, SOLUTION SUBCUTANEOUS at 21:25

## 2019-09-11 RX ADMIN — INSULIN LISPRO SCH UNITS: 100 INJECTION, SOLUTION INTRAVENOUS; SUBCUTANEOUS at 14:10

## 2019-09-11 RX ADMIN — Medication SCH MG: at 07:42

## 2019-09-11 RX ADMIN — Medication SCH MCG: at 07:42

## 2019-09-11 NOTE — NUR
[facility] CONTACT:



SW made TC to [facility] at [phone], to communicate w/ pt's 
assigned , Dorothea Olearyava at [phone]. SW left message requesting a return 
contact.

**Pt completed CASSIDY for communication w/ [facility]**



Asya Miller,  CSW

[id number]

Supervised by Nadeem Jeffries, [id number]

## 2019-09-11 NOTE — NUR
NURSING PROGRESS REPORT: 



Legal hold: Voluntary



Client on voluntary status for GD.



Report received from PITO Weller with use of SBAR.



Why are they here:  Pt. admitted for a mental health evaluation. His staff of the [facility] residence he lives at in [place] stated Mr. Patton has been refusing to care for 
himself for the past week. According to staff, Mr. Patton has not been eating or drinking, 
and has not been taking his medications. Per staff, Mr. Patton has schizophrenia and has 
gone into catatonic states in the past associated with his diabetes. Mr. Patton was seen at 
[facility] prior to this admission and prescribed antibiotics for possible pneumonia. 



Assessment



What has happened this shift:   Pt was sitting alone in community room at shift change. Pt 
is nonverbal and made minimal eye contact. Talked with patient and tried to encourage fluid 
intake as his mucus membranes are dry. Pt would open his mouth as if he wanted to drink, but 
when straw touched his lip he pulled his head back. Got patient up to take him to the 
bathroom and pt was wet. With the help of Brett PCT pt was toileting and changed into dry 
scrubs. Pt was up for HS snack, but refused. Pt was assisted back to his room where he again 
was toileted  and then assisted to bed. Pt took his medication without incident. HS BG was 
330. Pt was administered 18 units of Lantus in left lower ABD. Pt was a little more 
receptive when he was in bed and nodded when asked if he would take his medication. Pt's 
blunted affect became a little more animated.



S/I, H/I: Unable to assess

A/VH:  Unable to assess

Sleep: Currently sleeping with distress noted. See sleep assessment notation.

ADL's: Needs assistance and prompts. Pt is able to stand up from the toilet w/prompting. 

Group attendance: Night shift, no group

Were meds taken: Medication compliant

Any med S/E: None reported or observed.



Mental Status Exam



Appearance: Disheveled, wearing green unit scrubs,

Eye contact: Minimal

Behavior: Cooperative, delayed

Speech: Non verbal this shift 

Mood: Unable to assess

Affect: Blunted

Thought process: Unable to assess

Thought Content: Unable to assess

Cognition: A&O to self 

Insight: Unable to assess

Judgment: Unable to assess



Interventions



PRN's used: None



Therapeutic interventions: 1:1 therapeutic assessment, medication 
administration/education/monitoring, provided clear/simple instructions, toileting q2h, Q15 
min safety checks.



Restraints/seclusion/emergency medication: N/A



Justification of Continued Inpatient Treatment: Pt is gravely disabled and needs therapeutic 
support and medication management to provide stabilization and encourage independence. 

-------------------------------------------------------------------------------

Addendum: 09/12/19 at 0327 by Cristela Hendrix RN

-------------------------------------------------------------------------------

Assisted pt to toilet, noticed pt had mild congestive cough. Auscultated lungs - lower lobes 
clear, rhonchi heard in upper lobes. Encouraged pt to cough. Pt doesn't have strong cough 
reflex.Will continue to monitor and endorse to next shift.

-------------------------------------------------------------------------------

Addendum: 09/12/19 at 0328 by Cristela Hendrix RN

-------------------------------------------------------------------------------

Pt's temp 98.7

## 2019-09-11 NOTE — NUR
NURSING PROGRESS REPORT: 

Legal hold: Voluntary

Client on voluntary status for GD.

Report received from PITO Locke with use of SBAR.

Why are they here:  Pt. admitted for a mental health evaluation. His staff of the [facility] residence he lives at in [place] stated Mr. Patton has been refusing to care for 
himself for the past week. According to staff, Mr. Patton has not been eating or drinking, 
and has not been taking his medications. Per staff, Mr. Patton has schizophrenia and has 
gone into catatonic states in the past associated with his diabetes. Mr. Patton was seen at 
[facility] prior to this admission and prescribed antibiotics for possible pneumonia. 



Assessment



What has happened this shift: 



Pt resting in bed peacefully at change of shift. Pt's BG was 108 prior to breakfast.  He was 
given 6 units of Humalog. Pt was up and present for all meals and groups but is non-verbal 
the whole shift. He does not make eye contact and minimally responds to prompting. He had 
some difficulty taking his AM medications AEB holding them in his mouth until they began to 
melt and then took water to swallow them. For afternoon medications, I mixed them with 
applesauce and Pt took them without incident or difficulty. He ambulates himself with 
prompting and was dry in between his toliet prompting. He received 32 units of Humalog after 
lunch and his BG before dinner was 99. He received a one time dose of Ativan IM with no 
behavioral response. 1300 oral Ativan was held per TEE Junior's order. He is seen sitting in 
the community  room throughout the day. This RN attempted to engage and interact with 
patient, but Pt does not respond. Will continue to monitor.

S/I, H/I: Unable to assess

A/VH:  Unable to assess

Sleep: no sleep this shift 

ADL's: Needs assistance and prompts. Pt is able to stand up from the toilet w/prompting. 

Group attendance: Yes

Were meds taken: Yes

Any med S/E: None reported or observed.

Mental Status Exam

Appearance: Disheveled, wearing green unit scrubs,

Eye contact: none

Behavior: Cooperative, delayed

Speech: absent

Mood: appears disbondent, Unable to assess

Affect: blunted

Thought process: Unable to assess

Thought Content: Unable to assess

Cognition: Unable to assess 

Insight: Unable to assess

Judgment: Unable to assess

Interventions

PRN's used: None



Therapeutic interventions: 1:1 therapeutic assessment, medication 
administration/education/monitoring, provided clear/simple instructions, Q15 min safety 
checks.

Restraints/seclusion/emergency medication: N/A

Justification of Continued Inpatient Treatment: Pt is gravely disabled and needs therapeutic 
support and medication management to provide stabilization and encourage independence.

## 2019-09-11 NOTE — NUR
Reassessment: patient eating well with assistance, eating % PO with 

help. Non verbal. Having regular BMs. Will continue to follow. 

Recommendations:

1) Continue mech soft/chop all CHO controlled diet

2) DM education prior to discharge IF appropriate

3) Routine bowel care

4) Weekly wt

-------------------------------------------------------------------------------

Addendum: 09/11/19 at 1202 by Aida Null RD

-------------------------------------------------------------------------------

Amended: Links added.

## 2019-09-12 VITALS — SYSTOLIC BLOOD PRESSURE: 157 MMHG | DIASTOLIC BLOOD PRESSURE: 67 MMHG

## 2019-09-12 VITALS — SYSTOLIC BLOOD PRESSURE: 165 MMHG | DIASTOLIC BLOOD PRESSURE: 79 MMHG

## 2019-09-12 LAB
BASOPHILS # BLD AUTO: 0.1 X10'3 (ref 0–0.2)
BASOPHILS NFR BLD AUTO: 0.8 % (ref 0–1)
EOSINOPHIL # BLD AUTO: 0.2 X10'3 (ref 0–0.9)
EOSINOPHIL NFR BLD AUTO: 1.7 % (ref 0–6)
ERYTHROCYTE [DISTWIDTH] IN BLOOD BY AUTOMATED COUNT: 15 % (ref 11.5–14.5)
HCT VFR BLD AUTO: 44.9 % (ref 42–52)
HGB BLD-MCNC: 14.8 G/DL (ref 14–17.9)
LYMPHOCYTES # BLD AUTO: 1.4 X10'3 (ref 1.1–4.8)
LYMPHOCYTES NFR BLD AUTO: 15.9 % (ref 21–51)
MCH RBC QN AUTO: 28.2 PG (ref 27–31)
MCHC RBC AUTO-ENTMCNC: 32.9 G/DL (ref 33–36.5)
MCV RBC AUTO: 85.8 FL (ref 78–98)
MONOCYTES # BLD AUTO: 0.6 X10'3 (ref 0–0.9)
MONOCYTES NFR BLD AUTO: 7.3 % (ref 2–12)
NEUTROPHILS # BLD AUTO: 6.6 X10'3 (ref 1.8–7.7)
NEUTROPHILS NFR BLD AUTO: 74.3 % (ref 42–75)
PLATELET # BLD AUTO: 169 X10'3 (ref 140–440)
PMV BLD AUTO: 10.9 FL (ref 7.4–10.4)
RBC # BLD AUTO: 5.23 X10'6 (ref 4.7–6.1)
WBC # BLD AUTO: 8.9 X10'3 (ref 4.5–11)

## 2019-09-12 RX ADMIN — INSULIN GLARGINE SCH UNIT: 100 INJECTION, SOLUTION SUBCUTANEOUS at 22:16

## 2019-09-12 RX ADMIN — PANTOPRAZOLE SODIUM SCH MG: 40 TABLET, DELAYED RELEASE ORAL at 08:08

## 2019-09-12 RX ADMIN — INSULIN LISPRO SCH UNITS: 100 INJECTION, SOLUTION INTRAVENOUS; SUBCUTANEOUS at 14:53

## 2019-09-12 RX ADMIN — DIVALPROEX SODIUM SCH MG: 250 TABLET, EXTENDED RELEASE ORAL at 08:09

## 2019-09-12 RX ADMIN — Medication SCH MMU: at 21:04

## 2019-09-12 RX ADMIN — Medication SCH MCG: at 08:09

## 2019-09-12 RX ADMIN — THERA TABS SCH EACH: TAB at 08:11

## 2019-09-12 RX ADMIN — Medication SCH MMU: at 08:10

## 2019-09-12 RX ADMIN — Medication SCH MG: at 08:11

## 2019-09-12 RX ADMIN — INSULIN LISPRO SCH UNITS: 100 INJECTION, SOLUTION INTRAVENOUS; SUBCUTANEOUS at 18:26

## 2019-09-12 NOTE — NUR
Pt HS blood sugar was 43, pt showed no s/s of diaphoresis, confusion, or shakes; following 
hypoglycemic protocol pt was administered oral dextrose solution x2 bottles, with effect.  
BG was retaken 15 mins later 103; 15 mins after 153. Called Dr. Marie to get assistance 
on how much Lantus to administer. Per doctor administer scheduled 18 units of Lantus and 
decreases pt's diabetic protocol level from 6 to 5. Will continue to monitor and endorse to 
next shift. Pt sleeping comfortably.

## 2019-09-12 NOTE — NUR
[facility] CONTACT:



SW received message from Charity Wu at [phone], who returned contact for Dorothea Nunn. 
SW returned call and left message.



Asya Miller,  [facility]

[id number]

Supervised by Nadeem Jeffries, [id number]

-------------------------------------------------------------------------------

Addendum: 19 at 1234 by Asya Miller SS

-------------------------------------------------------------------------------

Addition to note:



Pt had visit w/ Dorothea Bull, who reports pt has his own apartment in [place] that he will 
return to once he is better. He reports pt has episodes of exacerbated sx causing PHF 
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 
 family members birthdays, etc. He encouraged this writer to contact Barb at 
[facility] at [phone], to schedule a visit and coordinate discharge planning.

## 2019-09-12 NOTE — NUR
NURSING PROGRESS REPORT: 

Legal hold: Voluntary

Client on voluntary status for GD.

Report received from PITO Locke with use of SBAR.

Why are they here:  Pt. admitted for a mental health evaluation. His staff of the [facility] residence he lives at in [place] stated Mr. Patton has been refusing to care for 
himself for the past week. According to staff, Mr. Patton has not been eating or drinking, 
and has not been taking his medications. Per staff, Mr. Patton has schizophrenia and has 
gone into catatonic states in the past associated with his diabetes. Mr. Patton was seen at 
[facility] prior to this admission and prescribed antibiotics for possible pneumonia. 

Assessment

What has happened this shift:   

Pt. asleep at start of shift. Pt. took medications but with much encouragement. Pt. 
hesitates when taking meds and eating. Pt. did not eat breakfast. 1:1 done at bedside. Pt. 
is non-verbal. Pt. very lethargic at breakfast time, holding food in his mouth and not 
swallowing, pt. intructed to spit out food which he did. RN received order for chest X-ray 
and CBC to r/o infectious proccess. Pt. WBC within normal limits and CXR negative. Pt. did 
eat lunch when fed by staff. Pt. was incontinent of urine in AM and was changed. Pt.'s CBG 
wa 121AM, 265 noon, 186 PM. 

S/I, H/I: Unable to assess

A/VH:  Unable to assess

Sleep: Pt. napped x2

ADL's: Needs assistance and prompts. Pt is able to stand up from the toilet w/prompting. 

Group attendance: Yes

Were meds taken: Medication compliant

Any med S/E: None reported or observed.

Mental Status Exam

Appearance: Disheveled, wearing green unit scrubs,

Eye contact: Minimal

Behavior: Cooperative, delayed

Speech: Non verbal this shift 

Mood: Unable to assess

Affect: Blunted

Thought process: Unable to assess

Thought Content: Unable to assess

Cognition: A&O to self 

Insight: Unable to assess

Judgment: Unable to assess

Interventions

PRN's used: None

Therapeutic interventions: 1:1 therapeutic assessment, medication 
administration/education/monitoring, provided clear/simple instructions, toileting q2h, Q15 
min safety checks.

Restraints/seclusion/emergency medication: N/A

Justification of Continued Inpatient Treatment: Pt is gravely disabled and needs therapeutic 
support and medication management to provide stabilization and encourage independence.

## 2019-09-13 VITALS — SYSTOLIC BLOOD PRESSURE: 157 MMHG | DIASTOLIC BLOOD PRESSURE: 70 MMHG

## 2019-09-13 RX ADMIN — DIVALPROEX SODIUM SCH MG: 250 TABLET, EXTENDED RELEASE ORAL at 08:08

## 2019-09-13 RX ADMIN — Medication SCH MMU: at 07:35

## 2019-09-13 RX ADMIN — INSULIN LISPRO SCH UNITS: 100 INJECTION, SOLUTION INTRAVENOUS; SUBCUTANEOUS at 15:01

## 2019-09-13 RX ADMIN — Medication PRN G: at 02:50

## 2019-09-13 RX ADMIN — INSULIN LISPRO SCH UNITS: 100 INJECTION, SOLUTION INTRAVENOUS; SUBCUTANEOUS at 09:17

## 2019-09-13 RX ADMIN — THERA TABS SCH EACH: TAB at 07:34

## 2019-09-13 RX ADMIN — Medication SCH MMU: at 21:01

## 2019-09-13 RX ADMIN — PANTOPRAZOLE SODIUM SCH MG: 40 TABLET, DELAYED RELEASE ORAL at 07:33

## 2019-09-13 RX ADMIN — Medication SCH MG: at 07:35

## 2019-09-13 RX ADMIN — INSULIN GLARGINE SCH UNIT: 100 INJECTION, SOLUTION SUBCUTANEOUS at 21:43

## 2019-09-13 RX ADMIN — Medication SCH MCG: at 07:34

## 2019-09-13 RX ADMIN — Medication PRN G: at 02:48

## 2019-09-13 NOTE — NUR
NURSING PROGRESS REPORT: 

Legal hold: Voluntary

Client on voluntary status for GD.

Report received from PITO Archibald with use of SBAR.

Why are they here:  Pt. admitted for a mental health evaluation. His staff of the [facility] residence he lives at in [place] stated Mr. Patton has been refusing to care for 
himself for the past week. According to staff, Mr. Patton has not been eating or drinking, 
and has not been taking his medications. Per staff, Mr. Patton has schizophrenia and has 
gone into catatonic states in the past associated with his diabetes. Mr. Patton was seen at 
[facility] prior to this admission and prescribed antibiotics for possible pneumonia. 

Assessment

What has happened this shift: 

Pt. asleep at start of [facility]. Pt. took all medications and ate all meals in the community 
room. Pt. fed himself and took medications with much encouragement from staff. Pt. refused 
to eat lunch and had to be bed by staff. Pt. needs encouragement for drinking PO fluids. Pt 
constricts his face when swallowing, pt. does not respond when asked if his throat is sore. 
Pt's affect is blunted with some frowning, Pt. appeared to be angry, furrowed brows when 
asked questions. Pt is hard to assess due to being non verbal. 1:1 assessment done at 
bedside. Pt. is non-verbal, lethargic, and selectively responds to staff promptings. Pt. is 
incontinent and needs assistance for toileting. Pt. attended groups. Pt.'s CBG this AM was 
107, noon was 181, and 1700 was 163. 

Pt. showered today

S/I, H/I: Unable to assess

A/VH:  Unable to assess

Sleep: Pt. napped x1 today. 

ADL's: Needs assistance and prompts. Pt is able to stand up from the toilet w/prompting. Pt 
fed himself at breakfast, but needed to be fed at lunch.  

Group attendance: Yes

Were meds taken: Medication compliant

Any med S/E: None reported or observed.

Mental Status Exam

Appearance: Disheveled, wearing green unit scrubs,

Eye contact: Minimal

Behavior: Cooperative, delayed

Speech: Non verbal this shift 

Mood: Depressed/angry

Affect: Blunted with some frowning

Thought process: Unable to assess

Thought Content: Unable to assess

Cognition: A&O to self and place

Insight: Unable to assess

Judgment: Unable to assess

Interventions

PRN's used: None

Therapeutic interventions: 1:1 therapeutic assessment, medication 
administration/education/monitoring, provided clear/simple instructions, toileting q2h, Q15 
min safety checks.

Restraints/seclusion/emergency medication: N/A

Justification of Continued Inpatient Treatment: Pt is gravely disabled and needs therapeutic 
support and medication management to provide stabilization and encourage independence.

## 2019-09-13 NOTE — NUR
NURSING PROGRESS REPORT: 





Legal hold: Voluntary



Client on voluntary status for GD.



Report received from PITO Lui with use of SBAR.



Why are they here:  Pt. admitted for a mental health evaluation. His staff of the [facility] residence he lives at in [place] stated Mr. Patton has been refusing to care for 
himself for the past week. According to staff, Mr. Patton has not been eating or drinking, 
and has not been taking his medications. Per staff, Mr. Patton has schizophrenia and has 
gone into catatonic states in the past associated with his diabetes. Mr. Patton was seen at 
[facility] prior to this admission and prescribed antibiotics for possible pneumonia. 



Assessment





What has happened this shift:  Pt sitting by himself in group, no acute distress noted. Pt 
is toileted at 1930 as per q2h. Pt is a 1PA. Pt is non verbal, but responds to commands. Pt 
ate his HS snack when fed by this writer. Pt still has a little respiratory congestion, but 
lungs are clear.  Pt's HS BG was 43, pt showed no s/s of diaphoresis, confusion, shakes. Pt 
was administered oral dextrose solution per hypoglycemic protocol with effect.  BG retaken 
in 15 mins registered at 103; then 153.  A phone call was placed to hospitalist Dr. Marie regarding admin of Lantus. Per doctor Marie,  Lantus is to be administered at 
the scheduled 18 units and pt is to be decreased from level 5 protocol to level 6. Will 
endorse to next shift. Pt is tolieted at 0030 and will again at 0500 as per bowel plan. Pt 
is being monitored throughout shift with no distress or s/s of hypoglycemia. 



S/I, H/I: Unable to assess

A/VH:  Unable to assess

Sleep: See sleep assessment notation.

ADL's: Needs assistance and prompts. Pt is able to stand up from the toilet w/prompting. Pt 
is a feeder.

Group attendance: Night shift, no group

Were meds taken: Medication compliant

Any med S/E: None reported or observed.



Mental Status Exam



Appearance: Disheveled, wearing green unit scrubs,

Eye contact: Minimal

Behavior: Cooperative, delayed

Speech: Non verbal this shift 

Mood: Unable to assess

Affect: Catatonic

Thought process: Unable to assess

Thought Content: Unable to assess

Cognition: A&O to self 

Insight: Unable to assess

Judgment: Unable to assess



Interventions



PRN's used: None



Therapeutic interventions: 1:1 therapeutic assessment, medication 
administration/education/monitoring, provided clear/simple instructions, toileting q2h, Q15 
min safety checks.



Restraints/seclusion/emergency medication: N/A



Justification of Continued Inpatient Treatment: Pt is gravely disabled and needs therapeutic 
support and medication management to provide stabilization and encourage independence. 

-------------------------------------------------------------------------------

Addendum: 09/13/19 at 0524 by Cristela Hendrix RN

-------------------------------------------------------------------------------

Woke pt up and ambulated to the toilet, pt's brief was dry. Encouraged fluids at encounter. 
Pt scrunched his face when swallowing, not sure if pt's throat is sore. Pt did take several 
sips from straw. Pt's affect was flat with some brightening. There was no brightening at 
beginning of shift. Pt also appeared to be angry, furrowed brows when asked questions. Pt is 
hard to assess due to being non verbal.

## 2019-09-14 VITALS — SYSTOLIC BLOOD PRESSURE: 171 MMHG | DIASTOLIC BLOOD PRESSURE: 68 MMHG

## 2019-09-14 VITALS — SYSTOLIC BLOOD PRESSURE: 136 MMHG | DIASTOLIC BLOOD PRESSURE: 79 MMHG

## 2019-09-14 RX ADMIN — INSULIN GLARGINE SCH UNIT: 100 INJECTION, SOLUTION SUBCUTANEOUS at 20:49

## 2019-09-14 RX ADMIN — Medication SCH MG: at 08:35

## 2019-09-14 RX ADMIN — METHYLPHENIDATE HYDROCHLORIDE SCH MG: 5 TABLET ORAL at 08:36

## 2019-09-14 RX ADMIN — Medication SCH MCG: at 08:34

## 2019-09-14 RX ADMIN — THERA TABS SCH EACH: TAB at 08:35

## 2019-09-14 RX ADMIN — PANTOPRAZOLE SODIUM SCH MG: 40 TABLET, DELAYED RELEASE ORAL at 08:36

## 2019-09-14 RX ADMIN — DIVALPROEX SODIUM SCH MG: 250 TABLET, EXTENDED RELEASE ORAL at 08:34

## 2019-09-14 RX ADMIN — INSULIN HUMAN SCH UNIT: 100 INJECTION, SOLUTION PARENTERAL at 18:07

## 2019-09-14 RX ADMIN — Medication SCH MMU: at 08:36

## 2019-09-14 RX ADMIN — Medication SCH MMU: at 20:36

## 2019-09-14 NOTE — NUR
NURSING PROGRESS REPORT: 

Legal hold: Voluntary

Client on voluntary status for GD.

Report received from Teresita Fajardo RN with use of SBAR.

Why are they here:  Pt. admitted for a mental health evaluation. His staff of the [facility] residence he lives at in [place] stated Mr. Patton has been refusing to care for 
himself for the past week. According to staff, Mr. Patton has not been eating or drinking, 
and has not been taking his medications. Per staff, Mr. Patton has schizophrenia and has 
gone into catatonic states in the past associated with his diabetes. Mr. Patton was seen at 
[facility] prior to this admission and prescribed antibiotics for possible pneumonia. 

Assessment

What has happened this shift:   

Pt. asleep at start of shift. Pt. woken up for CBG check. Pt. alert and oriented to name but 
patient is non-verbal so it is difficult to assess pt.'s mental status. Pt. took all 
medications and ate all meals in community room. Pt. needs assistance feeding. Pt. shows 
OCD-like behaviors, will hold toast in hand, bring it up and put it in his mouth but will 
not bite and put it down and repeat this multiple times. Pt. does not bite down fully unless 
staff feeds pt. Pt. grimaces often when drinking. Pt.'s dentures removed and cleaned. RN 
assessed pt.'s mouth for sores, no sores or signs of inflammation seen. Pt.'s sister visited 
pt. and said that pt. becomes significantly depressed about once a year, even to the point 
where pt. will not walk any longer. Sister informed RN that it is important to tell pt. what 
is going on when you are doing it. Sister believes episode began when pt. saw his frail 
older sister. Pt.'s CBG this AM was 133. Noon was 369, and 1700 was 364. Pt.'s Humulin R was 
increased to 5 units 20 minutes before each meal by hospitalist. Pt. had no bouts of 
incontinence today. Pt. ambulated x5 today. 

S/I, H/I: Unable to assess

A/VH:  Unable to assess

Sleep: Pt. napped x1 today. 

ADL's: Needs assistance and prompts. Pt is able to stand up from the toilet w/prompting. Pt. 
requires staff to feed and give him po fluids. 

Group attendance: Yes

Were meds taken: Medication compliant

Any med S/E: None reported or observed.

Mental Status Exam

Appearance: Disheveled, wearing green unit scrubs,

Eye contact: Minimal

Behavior: Cooperative, delayed

Speech: Non verbal 

Mood: Appears disthymic. 

Affect: Flat with some frowning

Thought process: Unable to assess

Thought Content: Unable to assess

Cognition: A&O to self 

Insight: Unable to assess

Judgment: Unable to assess

Interventions

PRN's used: None

Therapeutic interventions: 1:1 therapeutic assessment, medication, encourage independent 
performance of ADL's, provide clear,simple instructions,  
administration/education/monitoring, provided clear/simple instructions, toileting q2h, 
followed all fall precautions; Q15 min safety checks.

Restraints/seclusion/emergency medication: N/A

Justification of Continued Inpatient Treatment: Pt is gravely disabled and needs therapeutic 
support and medication management to provide stabilization and encourage independence.

## 2019-09-14 NOTE — NUR
NURSING PROGRESS REPORT: 





Legal hold: Voluntary



Client on voluntary status for GD.



Report received from PITO Rivas with use of SBAR.



Why are they here:  Pt. admitted for a mental health evaluation. His staff of the [facility] residence he lives at in [place] stated Mr. Patton has been refusing to care for 
himself for the past week. According to staff, Mr. Patton has not been eating or drinking, 
and has not been taking his medications. Per staff, Mr. Patton has schizophrenia and has 
gone into catatonic states in the past associated with his diabetes. Mr. Patton was seen at 
[facility] prior to this admission and prescribed antibiotics for possible pneumonia. 



Assessment





What has happened this shift:     Pt was sitting in group room at shift change. Pt continues 
to be non verbal, but affect is more bright. Pt also appeared to be angry or disgruntled. 
When this writer tried to assist in feeding, it  appeared that pt was pushing  this writer's 
hand away and frowning.  This same gesture happened during the time pt was being  assisted 
to the toilet. Pt's sister and her  came to visit, pt appeared to know they were 
there and allowed sister to assist in feeding. Pt eye contact is improving. Pt also assisted 
this writer in helping pull up his pants after toileting. Pt was compliant with meds and 
cooperative with 1:1 assessment. Pt's HS blood sugar was 234. Pt was also started on Ritalin 
10mg daily. 

 Pt's insulin administration was changed. Diabetic protocol was discontinued;  "For diabetes 
type 2 - long-acting insulin decreased to 15 units subcutaneous before bedtime, for 
short-acting insulin patient needs 3-5 units of subcutaneous insulin 20 minutes before 
meals.  Continue to check fasting blood sugar level which is expected to be between 80 and 
120 and nonfasting blood sugar level which is expected to be between 150- 200." 





S/I, H/I: Unable to assess

A/VH:  Unable to assess

Sleep: See sleep assessment notation.

ADL's: Needs assistance and prompts. Pt is able to stand up from the toilet w/prompting. Pt 
started feeding himself dinner then needed assistance.

Group attendance: Night shift, no group

Were meds taken: Medication compliant

Any med S/E: None reported or observed.





Mental Status Exam



Appearance: Disheveled, wearing green unit scrubs,

Eye contact: Minimal

Behavior: Cooperative, delayed

Speech: Non verbal 

Mood: Appeared to be angry

Affect: Flat with some frowning

Thought process: Unable to assess

Thought Content: Unable to assess

Cognition: A&O to self 

Insight: Unable to assess

Judgment: Unable to assess



Interventions



PRN's used: None



Therapeutic interventions: 1:1 therapeutic assessment, medication, encourage independent 
performance of ADL's, provide clear,simple instructions,  
administration/education/monitoring, provided clear/simple instructions, toileting q2h, 
followed all fall precautions; Q15 min safety checks.



Restraints/seclusion/emergency medication: N/A



Justification of Continued Inpatient Treatment: Pt is gravely disabled and needs therapeutic 
support and medication management to provide stabilization and encourage independence.

## 2019-09-15 VITALS — DIASTOLIC BLOOD PRESSURE: 70 MMHG | SYSTOLIC BLOOD PRESSURE: 166 MMHG

## 2019-09-15 VITALS — DIASTOLIC BLOOD PRESSURE: 71 MMHG | SYSTOLIC BLOOD PRESSURE: 144 MMHG

## 2019-09-15 RX ADMIN — INSULIN HUMAN SCH UNIT: 100 INJECTION, SOLUTION PARENTERAL at 13:20

## 2019-09-15 RX ADMIN — Medication SCH MCG: at 08:49

## 2019-09-15 RX ADMIN — PANTOPRAZOLE SODIUM SCH MG: 40 TABLET, DELAYED RELEASE ORAL at 08:48

## 2019-09-15 RX ADMIN — METHYLPHENIDATE HYDROCHLORIDE SCH MG: 5 TABLET ORAL at 08:49

## 2019-09-15 RX ADMIN — Medication SCH MMU: at 08:50

## 2019-09-15 RX ADMIN — Medication SCH MMU: at 21:07

## 2019-09-15 RX ADMIN — INSULIN GLARGINE SCH UNIT: 100 INJECTION, SOLUTION SUBCUTANEOUS at 21:22

## 2019-09-15 RX ADMIN — INSULIN HUMAN SCH UNIT: 100 INJECTION, SOLUTION PARENTERAL at 17:36

## 2019-09-15 RX ADMIN — INSULIN HUMAN SCH UNIT: 100 INJECTION, SOLUTION PARENTERAL at 07:00

## 2019-09-15 RX ADMIN — Medication SCH MG: at 08:49

## 2019-09-15 RX ADMIN — DIVALPROEX SODIUM SCH MG: 250 TABLET, EXTENDED RELEASE ORAL at 08:49

## 2019-09-15 RX ADMIN — THERA TABS SCH EACH: TAB at 08:59

## 2019-09-15 NOTE — NUR
NURSING PROGRESS REPORT: 

Legal hold: Voluntary

Client on voluntary status for GD.

Report received from Teresita Fajardo RN with use of SBAR.

Why are they here:  Pt. admitted for a mental health evaluation. His staff of the [facility] residence he lives at in [place] stated Mr. Patton has been refusing to care for 
himself for the past week. According to staff, Mr. Patton has not been eating or drinking, 
and has not been taking his medications. Per staff, Mr. Patton has schizophrenia and has 
gone into catatonic states in the past associated with his diabetes. Mr. Patton was seen at 
[facility] prior to this admission and prescribed antibiotics for possible pneumonia. 

Assessment

What has happened this shift:   

Pt. asleep at start of shift. Pt. woken up for HBG check. Pt. presents as somnolent at this 
time. Mental status has changed since this nurse last cared for patient last Wednesday. He 
had difficulty following direction and required two staff to hold him up and escort him to 
the bathroom. Did not void at this time. Escorted to community room for breakfast. Ate 75% 
of his meal when fed by staff.  Pt. took all medications as ordered. Sat in community room 
until noon. Visited with sister and brother in law at 10 AM. Sister reported "my brother 
isn't himself today. Did any of his meds change?" States their family is very sensitive to 
medication changes and she hopes this isn't happening to her brother. Patient has been 
placed on Ritalin recently. Dr. Morgan consulted about this medication, along with Stephen Junior. Decision made to discontinue the Ritalin. Brought back to room at noon. Patient 
immediately fell asleep.  BS = 386 at noon and 219 at 1546 and 151 at 1728.  Pt.'s Humulin R 
was increased to 5 units 20 minutes before each meal by hospitalist. Pt. had no bouts of 
incontinence today. 

S/I, H/I: Unable to assess

A/VH:  Unable to assess

Sleep: Pt. napped x1 today. 

ADL's: Needs assistance and prompts. Pt is able to stand up from the toilet w/prompting. Pt. 
requires staff to feed and give him po fluids. 

Group attendance: Yes

Were meds taken: Medication compliant

Any med S/E: None reported or observed.

Mental Status Exam

Appearance: Disheveled, wearing green unit scrubs,

Eye contact: Minimal

Behavior: Cooperative, delayed

Speech: Non verbal 

Mood: Appears disthymic. 

Affect: Flat with some frowning

Thought process: Unable to assess

Thought Content: Unable to assess

Cognition: A&O to self 

Insight: Unable to assess

Judgment: Unable to assess

Interventions

PRN's used: None

Therapeutic interventions: 1:1 therapeutic assessment, medication, encourage independent 
performance of ADL's, provide clear,simple instructions,  
administration/education/monitoring, provided clear/simple instructions, toileting q2h, 
followed all fall precautions; Q15 min safety checks.

Restraints/seclusion/emergency medication: N/A

Justification of Continued Inpatient Treatment: Pt is gravely disabled and needs therapeutic 
support and medication management to provide stabilization and encourage independence.

## 2019-09-15 NOTE — NUR
NURSING PROGRESS REPORT: 

Legal hold: Voluntary

Client on voluntary status for GD.

Report received from ZACHERY Rivas with use of SBAR.



Why are they here:  Pt. admitted for a mental health evaluation. His staff of the [facility] residence he lives at in [place] stated Mr. Patton has been refusing to care for 
himself for the past week. According to staff, Mr. Patton has not been eating or drinking, 
and has not been taking his medications. Per staff, Mr. Patton has schizophrenia and has 
gone into catatonic states in the past associated with his diabetes. Mr. Patton was seen at 
[facility] prior to this admission and prescribed antibiotics for possible pneumonia. 



Assessment

What has happened this shift:   

Pt. Up in group room at start of shift.  Non verbal, does not Pt movements slow and halting. 
 Pt does not feed self needs to be fed.  Sister and brother-in-law here to visit pt non 
responsive to them sat with eyes closed most of the time.  Visitors left after a short 
period of time.   Pt. alert and oriented to name but patient is non-verbal so it is 
difficult to assess pt.'s mental status. Pt. took all medications and ate all meals in 
community room. Pt. needs assistance feeding. 



Pt Ambulated with FWW and stand by assist from group room to his room.  To bathroom pt had 
already been incontinent of urine.  Sat on toilet ,  tootie-care done,  Clothes and linen 
change.  pt follows directions but very slowly.  Pt went to bed and to sleep almost 
immediately.   At midnight pt taken to toilet had not been incontinent and urinated on 
toilet.  



S/I, H/I: Unable to assess

A/VH:  Unable to assess

Sleep: Asleep at this time.  

ADL's: Needs assistance and prompts. Pt. requires staff to feed and give him po fluids. 

Group attendance: Yes

Were meds taken: Medication compliant

Any med S/E: None reported or observed.

Mental Status Exam

Appearance: Disheveled, wearing green unit scrubs,

Eye contact: Minimal

Behavior: Cooperative, delayed

Speech: Non verbal 

Mood: Appears disthymic. 

Affect: Flat with some frowning

Thought process: Unable to assess

Thought Content: Unable to assess

Cognition: A&O to self 

Insight: Unable to assess

Judgment: Unable to assess

Interventions

PRN's used: None

Therapeutic interventions: 1:1 therapeutic assessment, medication, encourage independent 
performance of ADL's, provide clear,simple instructions,  
administration/education/monitoring, provided clear/simple instructions, toileting q2h, 
followed all fall precautions; Q15 min safety checks.

Restraints/seclusion/emergency medication: N/A

Justification of Continued Inpatient Treatment: Pt is gravely disabled and needs therapeutic 
support and medication management to provide stabilization and encourage independence.

## 2019-09-16 VITALS — DIASTOLIC BLOOD PRESSURE: 71 MMHG | SYSTOLIC BLOOD PRESSURE: 144 MMHG

## 2019-09-16 VITALS — SYSTOLIC BLOOD PRESSURE: 163 MMHG | DIASTOLIC BLOOD PRESSURE: 73 MMHG

## 2019-09-16 RX ADMIN — INSULIN GLARGINE SCH UNIT: 100 INJECTION, SOLUTION SUBCUTANEOUS at 20:50

## 2019-09-16 RX ADMIN — INSULIN HUMAN SCH UNIT: 100 INJECTION, SOLUTION PARENTERAL at 13:01

## 2019-09-16 RX ADMIN — DIVALPROEX SODIUM SCH MG: 250 TABLET, EXTENDED RELEASE ORAL at 08:00

## 2019-09-16 RX ADMIN — PANTOPRAZOLE SODIUM SCH MG: 40 TABLET, DELAYED RELEASE ORAL at 08:00

## 2019-09-16 RX ADMIN — Medication SCH MG: at 07:59

## 2019-09-16 RX ADMIN — THERA TABS SCH EACH: TAB at 07:59

## 2019-09-16 RX ADMIN — INSULIN HUMAN SCH UNIT: 100 INJECTION, SOLUTION PARENTERAL at 17:30

## 2019-09-16 RX ADMIN — Medication SCH MCG: at 08:00

## 2019-09-16 RX ADMIN — INSULIN HUMAN SCH UNIT: 100 INJECTION, SOLUTION PARENTERAL at 07:56

## 2019-09-16 RX ADMIN — Medication SCH MMU: at 08:05

## 2019-09-16 RX ADMIN — Medication SCH MMU: at 20:37

## 2019-09-16 NOTE — NUR
NURSING PROGRESS REPORT: 

Legal hold: Voluntary

Client on voluntary status for GD.

Report received from PITO Brunner with use of SBAR.

Why are they here:  Pt. admitted for a mental health evaluation. His staff of the [facility] residence he lives at in [place] stated Mr. Patton has been refusing to care for 
himself for the past week. According to staff, Mr. Patton has not been eating or drinking, 
and has not been taking his medications. Per staff, Mr. Patton has schizophrenia and has 
gone into catatonic states in the past associated with his diabetes. Mr. Patton was seen at 
[facility] prior to this admission and prescribed antibiotics for possible pneumonia. 

Assessment

What has happened this shift:   

Pt. asleep at start of shift. Pt. woken up for HBG check. Pt. sleepy but more alert than 
yesterday. Escorted to community room for breakfast. Ate 75% of his meal when fed by staff.  
Pt. took all medications as ordered. Two sisters here to visit at 10 AM. Patient smiled on 
occasion while sisters talking with him. Primarily sat quietly by his twin sister with his 
eyes closed. Sisters report that patient "goes into this moods about once a year otherwise 
he takes total care of himself." Sisters report patient has both cheeks filled with apple 
crisp last night. They believe he is having difficulty swallowing solid food. Diet changed 
to mechanical soft.      Humulin R 5 units administered 20 minutes before each meal per 
order. Pt. had no episodes of incontinence today. 

S/I, H/I: Unable to assess

A/VH:  Unable to assess

Sleep: Pt. napped x1 today. 

ADL's: Needs assistance and prompts. Pt is able to stand up from the toilet w/prompting. Pt. 
requires staff to feed and give him po fluids. 

Group attendance: Yes

Were meds taken: Medication compliant

Any med S/E: None reported or observed.

Mental Status Exam

Appearance: Disheveled, wearing green unit scrubs,

Eye contact: Minimal

Behavior: Cooperative, delayed

Speech: Non verbal 

Mood: Appears dysthymic. 

Affect: Flat with some frowning

Thought process: Unable to assess

Thought Content: Unable to assess

Cognition: A&O to self 

Insight: Unable to assess

Judgment: Unable to assess

Interventions

PRN's used: None

Therapeutic interventions: 1:1 therapeutic assessment, medication, encourage independent 
performance of ADL's, provide clear,simple instructions,  
administration/education/monitoring, provided clear/simple instructions, toileting q2h, 
followed all fall precautions; Q15 min safety checks.

Restraints/seclusion/emergency medication: N/A

Justification of Continued Inpatient Treatment: Pt is gravely disabled and needs therapeutic 
support and medication management to provide stabilization and encourage independence.

## 2019-09-16 NOTE — NUR
NURSING PROGRESS REPORT: 

Legal hold: Voluntary

Client on voluntary status for GD.

Report received from ZACHERY Rivas with use of SBAR.



Why are they here:  Pt. admitted for a mental health evaluation. His staff of the [facility] residence he lives at in [place] stated Mr. Patton has been refusing to care for 
himself for the past week. According to staff, Mr. Patton has not been eating or drinking, 
and has not been taking his medications. Per staff, Mr. Patton has schizophrenia and has 
gone into catatonic states in the past associated with his diabetes. Mr. Patton was seen at 
[facility] prior to this admission and prescribed antibiotics for possible pneumonia. 



Assessment

What has happened this shift:   

Pt. Up in group room at start of shift. Pt fed dinner.   Non verbal,  Pt movements slow.  
Two sisters visited this shift.  They felt Pt was more responsive to them than prior shift.  
 Pt. alert and oriented to name but patient is non-verbal so it is difficult to assess pt.'s 
mental status. Pt will shake head yes slowly in response to questions and did smile a few 
times this shift.  Pt. took all medications and ate all meals in community room. 



Pt Ambulated with FWW and stand by assist from group room to his room.  To bathroom pt 
continent of urine.    Pt went to bed and to sleep almost immediately.  



S/I, H/I: Unable to assess

A/VH:  Unable to assess

Sleep: Asleep at this time.  

ADL's: Needs assistance and prompts. Pt. requires staff to feed and give him po fluids. 

Group attendance: Yes

Were meds taken: Medication compliant

Any med S/E: None reported or observed.

Mental Status Exam

Appearance: Disheveled, wearing green unit scrubs,

Eye contact: Minimal

Behavior: Cooperative, delayed

Speech: Non verbal 

Mood: Appears dysthymic. 

Affect: Flat with some smiling

Thought process: Unable to assess

Thought Content: Unable to assess

Cognition: A&O to self 

Insight: Unable to assess

Judgment: Unable to assess

Interventions

PRN's used: None

Therapeutic interventions: 1:1 therapeutic assessment, medication, encourage independent 
performance of ADL's, provide clear,simple instructions,  
administration/education/monitoring, provided clear/simple instructions, toileting q2h, 
followed all fall precautions; Q15 min safety checks.

Restraints/seclusion/emergency medication: N/A

Justification of Continued Inpatient Treatment: Pt is gravely disabled and needs therapeutic 
support and medication management to provide stabilization and encourage independence.

## 2019-09-16 NOTE — NUR
Reassessment: patient eating well with assistance, eating % PO with

help. Having regular BMs. Will continue to follow.

Recommendations:

1) [facility] soft[facility]chop all CHO controlled diet

2) DM education prior to discharge IF appropriate

3) Routine bowel care

4) Weekly wt

-------------------------------------------------------------------------------

Addendum: 09/16/19 at 1133 by Aida Null RD

-------------------------------------------------------------------------------

Amended: Links added.

## 2019-09-17 VITALS — DIASTOLIC BLOOD PRESSURE: 97 MMHG | SYSTOLIC BLOOD PRESSURE: 140 MMHG

## 2019-09-17 VITALS — DIASTOLIC BLOOD PRESSURE: 74 MMHG | SYSTOLIC BLOOD PRESSURE: 141 MMHG

## 2019-09-17 RX ADMIN — THERA TABS SCH EACH: TAB at 08:45

## 2019-09-17 RX ADMIN — INSULIN HUMAN SCH UNIT: 100 INJECTION, SOLUTION PARENTERAL at 17:20

## 2019-09-17 RX ADMIN — DIVALPROEX SODIUM SCH MG: 250 TABLET, EXTENDED RELEASE ORAL at 08:46

## 2019-09-17 RX ADMIN — INSULIN HUMAN SCH UNIT: 100 INJECTION, SOLUTION PARENTERAL at 07:00

## 2019-09-17 RX ADMIN — Medication SCH MMU: at 08:46

## 2019-09-17 RX ADMIN — PANTOPRAZOLE SODIUM SCH MG: 40 TABLET, DELAYED RELEASE ORAL at 08:46

## 2019-09-17 RX ADMIN — INSULIN GLARGINE SCH UNIT: 100 INJECTION, SOLUTION SUBCUTANEOUS at 21:04

## 2019-09-17 RX ADMIN — Medication SCH MCG: at 08:46

## 2019-09-17 RX ADMIN — INSULIN HUMAN SCH UNIT: 100 INJECTION, SOLUTION PARENTERAL at 13:01

## 2019-09-17 RX ADMIN — Medication SCH MG: at 08:46

## 2019-09-17 RX ADMIN — Medication SCH MMU: at 20:42

## 2019-09-17 NOTE — NUR
NURSING PROGRESS REPORT: 



Legal hold: Voluntary



Client on voluntary status for GD.



Report received from PITO Locke with use of SBAR.



Why are they here:  Pt. admitted for a mental health evaluation. His staff of the [facility] residence he lives at in [place] stated Mr. Patton has been refusing to care for 
himself for the past week. According to staff, Mr. Patton has not been eating or drinking, 
and has not been taking his medications. Per staff, Mr. Patton has schizophrenia and has 
gone into catatonic states in the past associated with his diabetes. Mr. Patton was seen at 
[facility] prior to this admission and prescribed antibiotics for possible pneumonia. 



Assessment





What has happened this shift:  Pt was up for breakfast, he was assisted by staff and ate 
100%. For lunch, he ate 75% being fed by staff. Pt is tolerating [facility] soft diet. Pt has a 
delayed swallow, needs sips of liquids between bites. Pt was able to take his medications 
whole 2 to 3 at a time in applesauce at breakfast. At lunch took 2 tablets in a spoonful of 
Jello without difficulty. Pt does have a moist nonproductive cough though lungs 
clear/diminished to auscultation. Pt is incontinent of urine, he needs prompted routine 
toileting. He is able to ambulate and toilet with prompts/assist of one. Pt is slow moving 
and nonverbal, he is able to follow simple directions, opens his eyes and looks at this RN 
when spoken to him but no verbal response. FSBG AC bkfst was 121, AC lunch was 332, staff 
reported he did not eat a snack. Pt continues on routine Humulin 5 units before meals.





S/I, H/I: Unable to assess, no unsafe behaviors noted

A/VH:  Pt is nonverbal, no indications of response to internal stimuli

Sleep: Pt slept7.75 hours per noc shift report.

ADL's: Pt currently not able to feed himself, he needs fed and given fluids, he is 
incontinent of urine and needs prompting/ routine toileting, he needs assist of one for 
ambulation, toileting, meals, and bathing. Pt is able to independently reposition himself in 
bed. 

Group attendance: Yes, passive participant

Were meds taken: Yes

Any med S/E: None noted





Mental Status Exam



Appearance: Slow moving, nonverbal elderly gentleman with cropped white hair dressed in 
green hospital scrubs

Eye contact: Fair

Behavior: Slow moving, calm, cooperative

Speech: Non verbal 

Mood: Unable to assess

Affect: Flat 

Thought process: Unable to assess

Thought Content: Unable to assess

Cognition: Unable to assess though pt responds to his name, seems to be at least A/O X 1

Insight: Unable to assess

Judgment: Unable to assess



Interventions



PRN's used: None



Therapeutic interventions: 1:1 assessment, task orientation; simple 1 to 2 step directions, 
swallow precautions, fall precautions, one person assist with ambulation, toileting, 
bathing, and feeding, medication administration/monitoring/education, frequent toileting, 
Q15 min safety checks.



Restraints/seclusion/emergency medication: N/A



Justification of Continued Inpatient Treatment: Pt is gravely disabled and needs therapeutic 
support and medication management to provide stabilization and encourage independence.

## 2019-09-17 NOTE — NUR
NURSING PROGRESS REPORT: 



Legal hold: Voluntary



Client on voluntary status for GD.



Report received from PITO Rivas with use of SBAR.



Why are they here:  Pt. admitted for a mental health evaluation. His staff of the [facility] residence he lives at in [place] stated Mr. Patton has been refusing to care for 
himself for the past week. According to staff, Mr. Patton has not been eating or drinking, 
and has not been taking his medications. Per staff, Mr. Patton has schizophrenia and has 
gone into catatonic states in the past associated with his diabetes. Mr. Patton was seen at 
[facility] prior to this admission and prescribed antibiotics for possible pneumonia. 



Assessment





What has happened this shift:  Pt in group room at start of shift.   Movements slow and 
halting.  Follows simple directions like take a step or cough.  Very little if any 
spontaneous movements.   



Took medications in applesauce without any difficulty.   Pt is non verbal.  continent of 
urine if toileted 



S/I, H/I: Unable to assess, no unsafe behaviors noted

A/VH:  Pt is nonverbal, no indications of response to internal stimuli

Sleep: Pt asleep at this time

ADL's: Pt currently not able to feed himself, he needs fed and given fluids, he is 
incontinent of urine and needs prompting/ routine toileting, he needs assist of one for 
ambulation, toileting, meals, and bathing. Pt is able to independently reposition himself in 
bed. 

Group attendance: Yes, passive participant

Were meds taken: Yes

Any med S/E: None noted





Mental Status Exam



Appearance: Slow moving, nonverbal elderly gentleman with cropped white hair dressed in 
green hospital scrubs and attractive sweater 

Eye contact: Fair

Behavior: Slow moving, calm, cooperative

Speech: Non verbal 

Mood: Unable to assess

Affect: Flat 

Thought process: Unable to assess

Thought Content: Unable to assess

Cognition: Unable to assess though pt responds to his name, seems to be at least A/O X 1

Insight: Unable to assess

Judgment: Unable to assess



Interventions



PRN's used: None



Therapeutic interventions: 1:1 assessment, task orientation; simple 1 to 2 step directions, 
swallow precautions, fall precautions, one person assist with ambulation, toileting, 
bathing, and feeding, medication administration/monitoring/education, frequent toileting, 
Q15 min safety checks.



Restraints/seclusion/emergency medication: N/A



Justification of Continued Inpatient Treatment: Pt is gravely disabled and needs therapeutic 
support and medication management to provide stabilization and encourage independence.

## 2019-09-17 NOTE — NUR
NURSING PROGRESS REPORT: 

Legal hold: Voluntary

Client on voluntary status for GD.

Report received from PITO Gomes with use of SBAR.



Why are they here:  Pt. admitted for a mental health evaluation. His staff of the [facility] residence he lives at in [place] stated Mr. Patton has been refusing to care for 
himself for the past week. According to staff, Mr. Patton has not been eating or drinking, 
and has not been taking his medications. Per staff, Mr. Patton has schizophrenia and has 
gone into catatonic states in the past associated with his diabetes. Mr. Patton was seen at 
[facility] prior to this admission and prescribed antibiotics for possible pneumonia. 

Assessment



What has happened this shift:   



Pt.  Sitting in group room at start of shift.  Non verbal will nod slightly at times to 
answer questions other times no reaction at all.   Sister here for visiting hour.  Fed pt 
salad.  Pt chewed and swallowed without difficulty.  



Pt often sits with eyes closed but will open them with encouragement.  Pt follows 
directions.  Able to ambulate to his room at far end of unit with FWW and stand-by assist.  
Pt has had no episodes of incontinence.  Assisted to toilet  by staff.  



S/I, H/I: Unable to assess

A/VH:  Unable to assess

Sleep: pt asleep at this time.  

ADL's: Needs assistance and prompts. Pt is able to stand up from the toilet w/prompting. Pt. 
requires staff to feed and give him po fluids. 

Group attendance: Yes

Were meds taken: Medication compliant

Any med S/E: None reported or observed.

Mental Status Exam

Appearance: Disheveled, wearing green unit scrubs,

Eye contact: Minimal

Behavior: Cooperative, delayed

Speech: Non verbal 

Mood: Appears dysthymic. 

Affect: Flat with some frowning

Thought process: Unable to assess

Thought Content: Unable to assess

Cognition: A&O to self 

Insight: Unable to assess

Judgment: Unable to assess

Interventions

PRN's used: None

Therapeutic interventions: 1:1 therapeutic assessment, medication, encourage independent 
performance of ADL's, provide clear,simple instructions,  
administration/education/monitoring, provided clear/simple instructions, toileting q2h, 
followed all fall precautions; Q15 min safety checks.

Restraints/seclusion/emergency medication: N/A

Justification of Continued Inpatient Treatment: Pt is gravely disabled and needs therapeutic 
support and medication management to provide stabilization and encourage independence.

## 2019-09-18 VITALS — DIASTOLIC BLOOD PRESSURE: 73 MMHG | SYSTOLIC BLOOD PRESSURE: 153 MMHG

## 2019-09-18 VITALS — SYSTOLIC BLOOD PRESSURE: 145 MMHG | DIASTOLIC BLOOD PRESSURE: 73 MMHG

## 2019-09-18 RX ADMIN — INSULIN GLARGINE SCH UNIT: 100 INJECTION, SOLUTION SUBCUTANEOUS at 20:51

## 2019-09-18 RX ADMIN — INSULIN HUMAN SCH UNIT: 100 INJECTION, SOLUTION PARENTERAL at 07:29

## 2019-09-18 RX ADMIN — Medication SCH MMU: at 08:08

## 2019-09-18 RX ADMIN — Medication SCH MCG: at 08:08

## 2019-09-18 RX ADMIN — PANTOPRAZOLE SODIUM SCH MG: 40 TABLET, DELAYED RELEASE ORAL at 08:09

## 2019-09-18 RX ADMIN — Medication SCH MMU: at 20:52

## 2019-09-18 RX ADMIN — THERA TABS SCH EACH: TAB at 08:08

## 2019-09-18 RX ADMIN — INSULIN HUMAN SCH UNIT: 100 INJECTION, SOLUTION PARENTERAL at 17:50

## 2019-09-18 RX ADMIN — DIVALPROEX SODIUM SCH MG: 250 TABLET, EXTENDED RELEASE ORAL at 08:08

## 2019-09-18 RX ADMIN — Medication SCH MG: at 08:08

## 2019-09-18 RX ADMIN — INSULIN HUMAN SCH UNIT: 100 INJECTION, SOLUTION PARENTERAL at 12:36

## 2019-09-18 NOTE — NUR
Handed pt a cup of pureed fruit at dinner. Pt held the cup in his left hand and used his 
right to feed himself the fruit on a spoon. Pt needs encouraged to hold one food item at at 
time, allow pt sufficient time to feed himself. Pt may be overwhelmed by multiple items on a 
tray. Handing him one item at a time increases the chance that pt will feed himself.

## 2019-09-18 NOTE — NUR
NURSING PROGRESS REPORT: 



Legal hold: Voluntary



Client on voluntary status for GD.



Report received from PITO Locke with use of SBAR.



Why are they here:  Pt. admitted for a mental health evaluation. His staff of the [facility] residence he lives at in [place] stated Mr. Patton has been refusing to care for 
himself for the past week. According to staff, Mr. Patton has not been eating or drinking, 
and has not been taking his medications. Per staff, Mr. Patton has schizophrenia and has 
gone into catatonic states in the past associated with his diabetes. Mr. Patton was seen at 
[facility] prior to this admission and prescribed antibiotics for possible pneumonia. 



Assessment





What has happened this shift:  Pt brighter, more alert today. Pt kept his eyes opened and 
made eye contact. Pt observed with a slight scowl from time to time. Pt had the appearance 
of considering not cooperating at times today with feeding and medication (would pull away 
from the spoon or lean backk away from staff) but with simple direction and gentle 
encouragement he did cooperate.



Pt was evaluated by speech therapy today and a pureed diet with thin liquids was 
recommended, pt ate 100% of his pureed lunch. Pt has both top and bottom dentures, the top 
ones are somewhat loose and denture adhesive is needed to keep in place. 



Pt showered today with assist and was toileted routinely. Pt maintained continence today 
while awake. PCT reported that pt had a medium formed BM. This RN asked pt after lunch and 
toileting if he wanted to stay up and return to the group room or whether he was tired and 
wished to lie down and nap. Pt stood there considering and seemed on the brink of a verbal 
response but did not verbalize his wishes. Requested pt show us by walking to his bed or out 
of the room into the hallway, pt's eyes indicated awareness of what was being asked of him, 
again he seemed to be considering his response though did not respond. Pt did not appear 
tired, this RN requested that PCT escort pt back to the group room, pt cooperated with 
ambulating back to the group room with stand by assist of one.



Pt continues on AC/HS FSBG checks and routine Humulin 5 units before meals. FSBG today AC 
bkfst was 120, AC lunch was 271.





S/I, H/I: Unable to assess, no unsafe behaviors noted

A/VH:  Pt is nonverbal, no indications of response to internal stimuli

Sleep: Pt slept 7.5 hours per noc shift report.

ADL's: Pt needs a one person assist with all ADLs. He responds well to task orientation and 
is able to follow simple 1 to 2 step commands. He is a total assist with feeding, so far, he 
will not even hold a cup in his hand. He requires prompting and stand by assist for 
transfers, toileting and ambulation, he needs help with wiping. If he is not toileted 
routinely, he is incontinent of bowel and bladder, he is nonverbal and unable to make his 
needs known at this time.

Group attendance: Yes, passive participant

Were meds taken: Yes

Any med S/E: None noted





Mental Status Exam



Appearance: Slow moving, nonverbal elderly gentleman with dentures and cropped white hair 
dressed in green hospital scrubs. 

Eye contact: Good

Behavior: Slow moving, mildly resistant to feeding/meds but cooperative with encouragement

Speech: Non verbal 

Mood: appears irritable/somewhat surly at times

Affect: Irritable, blunted

Thought process: Unable to assess

Thought Content: Unable to assess

Cognition: Unable to assess though pt responds to his name, seems to be at least A/O X 1

Insight: Unable to assess

Judgment: Unable to assess



Interventions



PRN's used: None



Therapeutic interventions: 1:1 assessment, task orientation; simple 1 to 2 step directions, 
positive reinforcement, swallow precautions, fall precautions, one person assist with 
ambulation, toileting, bathing, and feeding, medication administration/monitoring/education, 
routine toileting, Q15 min safety checks.



Restraints/seclusion/emergency medication: N/A



Justification of Continued Inpatient Treatment: Pt has a history of catatonic episodes, he 
is currently nonverbal and gravely disabled. He needs therapeutic support and medication 
management to provide stabilization and return to baseline level of function. If pt does not 
return to previous baseline level of functioning, he may need a higher level of care.

## 2019-09-19 VITALS — DIASTOLIC BLOOD PRESSURE: 61 MMHG | SYSTOLIC BLOOD PRESSURE: 159 MMHG

## 2019-09-19 VITALS — SYSTOLIC BLOOD PRESSURE: 141 MMHG | DIASTOLIC BLOOD PRESSURE: 62 MMHG

## 2019-09-19 RX ADMIN — Medication SCH MMU: at 07:38

## 2019-09-19 RX ADMIN — INSULIN HUMAN SCH UNIT: 100 INJECTION, SOLUTION PARENTERAL at 17:21

## 2019-09-19 RX ADMIN — Medication SCH MCG: at 07:38

## 2019-09-19 RX ADMIN — DOCUSATE SODIUM SCH MG: 100 CAPSULE, LIQUID FILLED ORAL at 20:43

## 2019-09-19 RX ADMIN — INSULIN GLARGINE SCH UNIT: 100 INJECTION, SOLUTION SUBCUTANEOUS at 21:15

## 2019-09-19 RX ADMIN — PANTOPRAZOLE SODIUM SCH MG: 40 TABLET, DELAYED RELEASE ORAL at 07:39

## 2019-09-19 RX ADMIN — THERA TABS SCH EACH: TAB at 07:39

## 2019-09-19 RX ADMIN — Medication SCH MG: at 07:38

## 2019-09-19 RX ADMIN — INSULIN HUMAN SCH UNIT: 100 INJECTION, SOLUTION PARENTERAL at 08:07

## 2019-09-19 RX ADMIN — INSULIN HUMAN SCH UNIT: 100 INJECTION, SOLUTION PARENTERAL at 12:56

## 2019-09-19 RX ADMIN — Medication SCH MMU: at 20:43

## 2019-09-19 RX ADMIN — DIVALPROEX SODIUM SCH MG: 250 TABLET, EXTENDED RELEASE ORAL at 08:34

## 2019-09-19 NOTE — NUR
NURSING PROGRESS REPORT: 



Legal hold: Voluntary



Client on voluntary status for GD.



Report received from PITO Weller with use of SBAR.



Why are they here:  Pt. admitted for a mental health evaluation. His staff of the [facility] residence he lives at in [place] stated Mr. Patton has been refusing to care for 
himself for the past week. According to staff, Mr. Patton has not been eating or drinking, 
and has not been taking his medications. Per staff, Mr. Patton has schizophrenia and has 
gone into catatonic states in the past associated with his diabetes. Mr. Patton was seen at 
[facility] prior to this admission and prescribed antibiotics for possible pneumonia. 



Assessment





What has happened this shift:  

Patient in the group room finishing his meal at the beginning of shift. Shortly after 
finishing he ambulated the ordonez using his walker with staff stand by. Patient pleasant, only 
answers questions with a "yes" or "no" and sometimes not answering at all. Patient , 
received HS Lantus 15 units in LLQ. Patient compliant with oral medications and tolerated 
well. Patient continues to be toileted by staff. Patient endorsed depression but did not 
answer questions regarding SI, V/AH. 



S/I, H/I: Unable to assess, no unsafe behaviors noted

A/VH:  Pt is nonverbal, no indications of response to internal stimuli

Sleep: asleep at this time

ADL's: Pt needs a one person assist with all ADLs. He responds well to task orientation and 
is able to follow simple 1 to 2 step commands. He is a total assist with feeding, so far, he 
will not even hold a cup in his hand. He requires prompting and stand by assist for 
transfers, toileting and ambulation, he needs help with wiping. If he is not toileted 
routinely, he is incontinent of bowel and bladder, he is nonverbal and unable to make his 
needs known at this time.

Group attendance:no groups this shift

Were meds taken: Yes

Any med S/E: None noted





Mental Status Exam



Appearance: Slow moving, nonverbal elderly gentleman with dentures and cropped white hair 
dressed in green hospital scrubs. 

Eye contact: Good

Behavior: Slow moving, mildly resistant to feeding/meds but cooperative with encouragement

Speech: Non verbal 

Mood: appears irritable/somewhat surly at times

Affect: Irritable, blunted

Thought process: Unable to assess

Thought Content: Unable to assess

Cognition: Unable to assess though pt responds to his name, seems to be at least A/O X 1

Insight: Unable to assess

Judgment: Unable to assess



Interventions



PRN's used: None



Therapeutic interventions: 1:1 assessment, task orientation; simple 1 to 2 step directions, 
positive reinforcement, swallow precautions, fall precautions, one person assist with 
ambulation, toileting, bathing, and feeding, medication administration/monitoring/education, 
routine toileting, Q15 min safety checks.



Restraints/seclusion/emergency medication: N/A



Justification of Continued Inpatient Treatment: Pt has a history of catatonic episodes, he 
is currently nonverbal and gravely disabled. He needs therapeutic support and medication 
management to provide stabilization and return to baseline level of function. If pt does not 
return to previous baseline level of functioning, he may need a higher level of care.

## 2019-09-19 NOTE — NUR
NURSING PROGRESS REPORT: 

Legal hold: Voluntary

Client on voluntary status for GD.

Report received from PITO Locke with use of SBAR.

Why are they here:  Pt. admitted for a mental health evaluation. His staff of the [facility] residence he lives at in [place] stated Mr. Patton has been refusing to care for 
himself for the past week. According to staff, Mr. Patton has not been eating or drinking, 
and has not been taking his medications. Per staff, Mr. Patton has schizophrenia and has 
gone into catatonic states in the past associated with his diabetes. Mr. Patton was seen at 
[facility] prior to this admission and prescribed antibiotics for possible pneumonia. 

Assessment

What has happened this shift:  

Pt. asleep at start of shift. Pt. woken up for CBG and became agitateed but allowed RN to do 
CBG. RN asked pt. if he wanted to get up to use the bathroom and pt. replied, "No". Pt. took 
all medications and ate all meals in the community room. Pt. is a 1 person assist to feet 
and toilet. Pt. is resistive to care at times today, staff attempted to encourage fluids and 
pt. refused. Pt. has pressure sore on bottom that is [id number]. optifoam dressing applied. Pt. 
had small bm today. Pt.'s CBG today were , noon 287, 1700 334. Pt.'s sister encouraged 
staff to sing to pt., when this RN attempted to sing to pt., pt. began to cry. 

S/I, H/I: Unable to assess, no unsafe behaviors noted

A/VH:  Pt. does not respond. no indications of response to internal stimuli

Sleep: Pt. napped x1

ADL's: Pt needs a one person assist with all ADLs. He responds well to task orientation and 
is able to follow simple 1 to 2 step commands. He is a total assist with feeding, so far, he 
will not even hold a cup in his hand. He requires prompting and stand by assist for 
transfers, toileting and ambulation, he needs help with wiping. If he is not toileted 
routinely, he is incontinent of bowel and bladder, he is minimaly verbal and unable to make 
his needs known at this time.

Group attendance: Yes

Were meds taken: Yes

Any med S/E: None noted

Mental Status Exam

Appearance: Slow moving, elderly gentleman with dentures and cropped white hair dressed in 
green hospital scrubs. 

Eye contact: Good

Behavior: Slow moving, mildly resistant to feeding/meds but cooperative with encouragement

Speech: Non verbal 

Mood: agitated/depressed

Affect: Blunted

Thought process: Unable to assess

Thought Content: Unable to assess

Cognition: Unable to assess though pt responds to his name, seems to be at least A/O X 1

Insight: Unable to assess

Judgment: Unable to assess

Interventions

PRN's used: None

Therapeutic interventions: 1:1 assessment, task orientation; simple 1 to 2 step directions, 
positive reinforcement, swallow precautions, fall precautions, one person assist with 
ambulation, toileting, bathing, and feeding, medication administration/monitoring/education, 
routine toileting, Q15 min safety checks.

Restraints/seclusion/emergency medication: N/A

Justification of Continued Inpatient Treatment: Pt has a history of catatonic episodes, he 
is currently nonverbal and gravely disabled. He needs therapeutic support and medication 
management to provide stabilization and return to baseline level of function. If pt does not 
return to previous baseline level of functioning, he may need a higher level of care.

## 2019-09-20 VITALS — SYSTOLIC BLOOD PRESSURE: 130 MMHG | DIASTOLIC BLOOD PRESSURE: 55 MMHG

## 2019-09-20 VITALS — DIASTOLIC BLOOD PRESSURE: 77 MMHG | SYSTOLIC BLOOD PRESSURE: 174 MMHG

## 2019-09-20 RX ADMIN — Medication SCH MMU: at 08:24

## 2019-09-20 RX ADMIN — Medication SCH MMU: at 20:39

## 2019-09-20 RX ADMIN — INSULIN GLARGINE SCH UNIT: 100 INJECTION, SOLUTION SUBCUTANEOUS at 20:48

## 2019-09-20 RX ADMIN — INSULIN HUMAN SCH UNIT: 100 INJECTION, SOLUTION PARENTERAL at 14:37

## 2019-09-20 RX ADMIN — Medication SCH MCG: at 08:22

## 2019-09-20 RX ADMIN — INSULIN HUMAN SCH UNIT: 100 INJECTION, SOLUTION PARENTERAL at 08:19

## 2019-09-20 RX ADMIN — PANTOPRAZOLE SODIUM SCH MG: 40 TABLET, DELAYED RELEASE ORAL at 08:23

## 2019-09-20 RX ADMIN — DIVALPROEX SODIUM SCH MG: 250 TABLET, EXTENDED RELEASE ORAL at 08:21

## 2019-09-20 RX ADMIN — INSULIN HUMAN SCH UNIT: 100 INJECTION, SOLUTION PARENTERAL at 17:24

## 2019-09-20 RX ADMIN — DOCUSATE SODIUM SCH MG: 100 CAPSULE, LIQUID FILLED ORAL at 08:24

## 2019-09-20 RX ADMIN — Medication SCH MG: at 08:25

## 2019-09-20 RX ADMIN — THERA TABS SCH EACH: TAB at 08:22

## 2019-09-20 RX ADMIN — DOCUSATE SODIUM SCH MG: 100 CAPSULE, LIQUID FILLED ORAL at 20:39

## 2019-09-20 NOTE — NUR
DISCHARGE PLANNING: Called HORTENSIA and Barb at [facility]. [facility], awaiting calls back

## 2019-09-20 NOTE — NUR
ANIBAL PROGRESS REPORT: 

Legal hold: Voluntary

Client on voluntary status for GD.

Report received from PITO Gabriel with use of SBAR.



Why are they here:  Pt. admitted for a mental health evaluation. His staff of the [facility] residence he lives at in [place] stated Mr. Patton has been refusing to care for 
himself for the past week. According to staff, Mr. Patton has not been eating or drinking, 
and has not been taking his medications. Per staff, Mr. Patton has schizophrenia and has 
gone into catatonic states in the past associated with his diabetes. Mr. Patton was seen at 
[facility] prior to this admission and prescribed antibiotics for possible pneumonia. 



Assessment

What has happened this shift:  

Patient is sitting on a Christian cushion in the day room watching television. Patient is non 
verbal. Eye contact is poor. He is warm and dry with good color. Patient ate his meal with 
assistance from a tech. Patient ambulated with walker and assist to the bathroom. Patient 
given tootie care. Bandaging is intact. Patient follows is able to take evening medications. 
Patient is rolled side to side with pillow placement Q 2 hours. Patient drank PO fluids at 
bedtime. (6 oz)

Pt. requires much time to respond to directions. Also give pt. much encouragement in 
directions and reassurance. 



S/I, H/I: Unable to assess, no unsafe behaviors noted

A/VH:  Pt is nonverbal, no indications of response to internal stimuli.

Sleep: Asleep at this time

ADL's: Pt needs a one person assist with all ADLs. He responds well to task orientation and 
is able to follow simple 1 to 2 step commands. He is a total assist with feeding, so far, he 
will not even hold a cup in his hand. He requires prompting and stand by assist for 
transfers, toileting and ambulation, he needs help with wiping. If he is not toileted 
routinely, he is incontinent of bowel and bladder, he is nonverbal and unable to make his 
needs known at this time.

Group attendance: yes

Were meds taken: Yes

Any med S/E: None observed

Mental Status Exam

Appearance: Slow moving, nonverbal elderly gentleman with dentures and cropped white hair 
dressed in green hospital scrubs. 

Eye contact: Good

Behavior: Slow moving, mildly resistant to feeding/meds but cooperative with encouragement

Speech: Minimal. Responds with "yes" and "no"

Mood: agitated with a few moments of cheerfulness and smiles. 

Affect: Flat with moments of brightening. 

Thought process: Unable to assess

Thought Content: Unable to assess

Cognition: A&Ox1

Insight: Poor

Judgment: Poor

Interventions

PRN's used: None

Therapeutic interventions: 1:1 assessment, task orientation; simple 1 to 2 step directions, 
positive reinforcement, swallow precautions, fall precautions, one person assist with 
ambulation, toileting, bathing, and feeding, medication administration/monitoring/education, 
routine toileting, Q15 min safety checks.

Restraints/seclusion/emergency medication: N/A

Justification of Continued Inpatient Treatment: Pt has a history of catatonic episodes, he 
is currently nonverbal and gravely disabled. He needs therapeutic support and medication 
management to provide stabilization and return to baseline level of function. If pt does not 
return to previous baseline level of functioning, he may need a higher level of care.

## 2019-09-20 NOTE — NUR
NURSING PROGRESS REPORT: 



Legal hold: Voluntary



Client on voluntary status for GD.



Report received from PITO Gabriel with use of SBAR.



Why are they here:  Pt. admitted for a mental health evaluation. His staff of the [facility] residence he lives at in [place] stated Mr. Patton has been refusing to care for 
himself for the past week. According to staff, Mr. Patton has not been eating or drinking, 
and has not been taking his medications. Per staff, Mr. Patton has schizophrenia and has 
gone into catatonic states in the past associated with his diabetes. Mr. Patton was seen at 
[facility] prior to this admission and prescribed antibiotics for possible pneumonia. 



Assessment





What has happened this shift:  

Patient sitting in the group room at shift change. This writer attempted to do HS FSBG 
before patient ate his snack but the patient was resisting. CRN was notified and she 
attempted as well but the patient continued to be unwilling, pulling away and guarded.  With 
some time and the help of a tech that he is more comfortable with his HS FSBG was able to be 
done. Patient continued to pull away from this writer during med pass and again with the 
help of the tech coaching the patient he was willing to take his medication. The patient 
continues to be toileted through the shift. 



S/I, H/I: Unable to assess, no unsafe behaviors noted

A/VH:  Pt is nonverbal, no indications of response to internal stimuli

Sleep: asleep at this time

ADL's: Pt needs a one person assist with all ADLs. He responds well to task orientation and 
is able to follow simple 1 to 2 step commands. He is a total assist with feeding, so far, he 
will not even hold a cup in his hand. He requires prompting and stand by assist for 
transfers, toileting and ambulation, he needs help with wiping. If he is not toileted 
routinely, he is incontinent of bowel and bladder, he is nonverbal and unable to make his 
needs known at this time.

Group attendance:no groups this shift

Were meds taken: Yes

Any med S/E: None observed





Mental Status Exam



Appearance: Slow moving, nonverbal elderly gentleman with dentures and cropped white hair 
dressed in green hospital scrubs. 

Eye contact: Good

Behavior: Slow moving, mildly resistant to feeding/meds but cooperative with encouragement

Speech: Non verbal 

Mood: agitated

Affect: Irritable, blunted

Thought process: Unable to assess

Thought Content: Unable to assess

Cognition: Unable to assess though pt responds to his name, seems to be at least A/O X 1

Insight: Unable to assess

Judgment: Unable to assess



Interventions



PRN's used: None



Therapeutic interventions: 1:1 assessment, task orientation; simple 1 to 2 step directions, 
positive reinforcement, swallow precautions, fall precautions, one person assist with 
ambulation, toileting, bathing, and feeding, medication administration/monitoring/education, 
routine toileting, Q15 min safety checks.



Restraints/seclusion/emergency medication: N/A



Justification of Continued Inpatient Treatment: Pt has a history of catatonic episodes, he 
is currently nonverbal and gravely disabled. He needs therapeutic support and medication 
management to provide stabilization and return to baseline level of function. If pt does not 
return to previous baseline level of functioning, he may need a higher level of care.

## 2019-09-20 NOTE — NUR
NURSING PROGRESS REPORT: 

Legal hold: Voluntary

Client on voluntary status for GD.

Report received from PITO Gabriel with use of SBAR.

Why are they here:  Pt. admitted for a mental health evaluation. His staff of the [facility] residence he lives at in [place] stated Mr. Patton has been refusing to care for 
himself for the past week. According to staff, Mr. Patton has not been eating or drinking, 
and has not been taking his medications. Per staff, Mr. Patton has schizophrenia and has 
gone into catatonic states in the past associated with his diabetes. Mr. Patton was seen at 
[facility] prior to this admission and prescribed antibiotics for possible pneumonia. 

Assessment

What has happened this shift:  

Pt. alseep at start of shift. Pt.'s AM CBG was 75. Pt. was incontinent and changed. Pt. 
ambulated to breakfast with walker. Pt. total assist for breakfast, when RN attempted to put 
fork into pt.'s hand, pt.'s hand became tremulous so that he could not hold the fork. Pt. 
initally refused taking bites but then ate about half of his tray. Pt. took all medications. 
Pt. minimally verbal, only responding with "Yes" or "No". During 1:1 assessment pt.'s coccyx 
wound optiform bandage changed. wound is clean and intact with minimal drainage. diabetic 
foot ulcer was discovered. Wound consult placed and wound nurse ordered barrier spray and 
optiform bandage q day. Also a wound bed was ordered. 

Hospitalist ordered the following:

Rotate pt. q 2 hours while in bed. Pt. to wear boots while in bed. Pt. to be bathed daily. 
Barrier cream to be applied daily. Pt. to use Christian Cushion while sitting in chair. 

Wound care instructions: cleanse Coccyx wound with normal saline and pat dry, apply barrier 
spray, and then optifoam. assess wound qshift and change bandage Q2days. 

Pt. refuses water so give patient sugar free powder mixed in water. Toilet pt. frequently, 
pt. was incontinent x2 today. 

Pt. requires much time to respond to directions. Also give pt. much encouragement in 
directions and reassurance. 

S/I, H/I: Unable to assess, no unsafe behaviors noted

A/VH:  Pt is nonverbal, no indications of response to internal stimuli

Sleep: asleep at this time

ADL's: Pt needs a one person assist with all ADLs. He responds well to task orientation and 
is able to follow simple 1 to 2 step commands. He is a total assist with feeding, so far, he 
will not even hold a cup in his hand. He requires prompting and stand by assist for 
transfers, toileting and ambulation, he needs help with wiping. If he is not toileted 
routinely, he is incontinent of bowel and bladder, he is nonverbal and unable to make his 
needs known at this time.

Group attendance: yes

Were meds taken: Yes

Any med S/E: None observed

Mental Status Exam

Appearance: Slow moving, nonverbal elderly gentleman with dentures and cropped white hair 
dressed in green hospital scrubs. 

Eye contact: Good

Behavior: Slow moving, mildly resistant to feeding/meds but cooperative with encouragement

Speech: Minimal. Responds with "yes" and "no"

Mood: agitated with a few moments of cheerfulness and smiles. 

Affect: Flat with moments of brightening. 

Thought process: Unable to assess

Thought Content: Unable to assess

Cognition: A&Ox1

Insight: Poor

Judgment: Poor

Interventions

PRN's used: None

Therapeutic interventions: 1:1 assessment, task orientation; simple 1 to 2 step directions, 
positive reinforcement, swallow precautions, fall precautions, one person assist with 
ambulation, toileting, bathing, and feeding, medication administration/monitoring/education, 
routine toileting, Q15 min safety checks.

Restraints/seclusion/emergency medication: N/A

Justification of Continued Inpatient Treatment: Pt has a history of catatonic episodes, he 
is currently nonverbal and gravely disabled. He needs therapeutic support and medication 
management to provide stabilization and return to baseline level of function. If pt does not 
return to previous baseline level of functioning, he may need a higher level of care.

## 2019-09-21 VITALS — SYSTOLIC BLOOD PRESSURE: 162 MMHG | DIASTOLIC BLOOD PRESSURE: 94 MMHG

## 2019-09-21 VITALS — SYSTOLIC BLOOD PRESSURE: 158 MMHG | DIASTOLIC BLOOD PRESSURE: 80 MMHG

## 2019-09-21 RX ADMIN — MESALAMINE SCH GM: 1.2 TABLET, DELAYED RELEASE ORAL at 17:24

## 2019-09-21 RX ADMIN — PANTOPRAZOLE SODIUM SCH MG: 40 TABLET, DELAYED RELEASE ORAL at 07:46

## 2019-09-21 RX ADMIN — DIVALPROEX SODIUM SCH MG: 250 TABLET, EXTENDED RELEASE ORAL at 08:34

## 2019-09-21 RX ADMIN — INSULIN HUMAN SCH UNIT: 100 INJECTION, SOLUTION PARENTERAL at 17:16

## 2019-09-21 RX ADMIN — Medication SCH MMU: at 21:15

## 2019-09-21 RX ADMIN — THERA TABS SCH EACH: TAB at 07:47

## 2019-09-21 RX ADMIN — Medication SCH MMU: at 07:48

## 2019-09-21 RX ADMIN — DOCUSATE SODIUM SCH MG: 100 CAPSULE, LIQUID FILLED ORAL at 07:48

## 2019-09-21 RX ADMIN — INSULIN HUMAN SCH UNIT: 100 INJECTION, SOLUTION PARENTERAL at 12:55

## 2019-09-21 RX ADMIN — DOCUSATE SODIUM SCH MG: 100 CAPSULE, LIQUID FILLED ORAL at 20:00

## 2019-09-21 RX ADMIN — Medication SCH MCG: at 07:48

## 2019-09-21 RX ADMIN — Medication SCH MG: at 07:46

## 2019-09-21 RX ADMIN — INSULIN GLARGINE SCH UNIT: 100 INJECTION, SOLUTION SUBCUTANEOUS at 21:18

## 2019-09-21 RX ADMIN — INSULIN HUMAN SCH UNIT: 100 INJECTION, SOLUTION PARENTERAL at 07:45

## 2019-09-21 NOTE — NUR
DM/wound consult: Pt not appropriate for DM ed given DX. Hospital-acquired 

coccyx DTI per consult/WOC notes. Pt advanced to pureed/thin liquids per 

SP recs requiring feeder but eating well % meals does fluctuate on 

occasion. Would benefit from Scotty shakes given new wound; MD notified. 

Pending MD verification prior to sending on trays. LB 9/19. Will continue 

to monitor. 

Recommendations:

1) Continue pureed/thin/CHO controlled diet per SP

2) strawberry/vanilla Scotty shakes BIDLD pending MD approval

3) Routine bowel care

4) Weekly wt

-------------------------------------------------------------------------------

Addendum: 09/21/19 at 1043 by Zay Washburn RD

-------------------------------------------------------------------------------

Amended: Links added.

## 2019-09-21 NOTE — NUR
NURSING PROGRESS REPORT: 

Legal hold: Voluntary

Client on voluntary status for GD.

Report received from PITO Alegria with use of SBAR.

Why are they here:  Pt. admitted for a mental health evaluation. His staff of the [facility] residence he lives at in [place] stated Mr. Patton has been refusing to care for 
himself for the past week. According to staff, Mr. Patton has not been eating or drinking, 
and has not been taking his medications. Per staff, Mr. Patton has schizophrenia and has 
gone into catatonic states in the past associated with his diabetes. Mr. Patton was seen at 
[facility] prior to this admission and prescribed antibiotics for possible pneumonia. 

Assessment

What has happened this shift:  

Pt. asleep at start of shift. Pt. took medications ate all meals in community room. Pt. 
needs x1 assist to toilet and feed. Pt. Did feed himself a 1/2 jello today. Pt. needs 
prompting to drink and will only drink flavored drinks. Pt. showered today, wound assessed 
and shows no S&S of infect, wound has scant serosangeounous dressing, barrier spray applied 
and covered with optifoam bandage. Pt. walked multiple times today. Pt. in bed twice for 2.5 
hours roating pt. on his side every 2 horus. Pillows placed between knees to protect bony 
prominices. Pt. was mostly non-vebal today stating, "Yes" and "No" at times. Pt. given 
lyrica for pain, when RN asked pt., "Do you want pain medicine? Pt. reached out and took his 
medication by himself. Pt. used Christian Matress when sitting. Pt. had small BMx. Pt. startedon 
Mesalamin 1.2 grams for pt.'s Chrons diease. Pt. is resistive to feeding at times and needs 
encouragement. 

Pt. requires much time to respond to directions. Also give pt. much encouragement in 
directions and reassurance. 

S/I, H/I: Unable to assess, no unsafe behaviors noted

A/VH:  Unable to assess, no indications of response to internal stimuli.

Sleep: Asleep at this time

ADL's: Pt needs a one person assist with all ADLs. He responds well to task orientation and 
is able to follow simple 1 to 2 step commands. He is a total assist with feeding, so far, he 
will not even hold a cup in his hand. He requires prompting and stand by assist for 
transfers, toileting and ambulation, he needs help with wiping. If he is not toileted 
routinely, he is incontinent of bowel and bladder, he is nonverbal and unable to make his 
needs known at this time.

Pt. showered today. 

Group attendance: yes

Were meds taken: Yes

Any med S/E: None observed

Mental Status Exam

Appearance: Slow moving, nonverbal elderly gentleman with dentures and cropped white hair 
dressed in green hospital scrubs. 

Eye contact: Good

Behavior: Slow moving, mildly resistant to feeding/meds but cooperative with encouragement

Speech: Minimal. Responds with "yes" and "no"

Mood: agitated but more cheerful today.

Affect: Flat with moments of grimicing and brightening. Pt. laughed a few times today. 

Thought process: Unable to assess

Thought Content: Unable to assess

Cognition: A&Ox1

Insight: Poor

Judgment: Poor

Interventions

PRN's used: Lyrica

Therapeutic interventions: 1:1 assessment, task orientation; simple 1 to 2 step directions, 
positive reinforcement, swallow precautions, fall precautions, one person assist with 
ambulation, toileting, bathing, and feeding, medication administration/monitoring/education, 
routine toileting, Q15 min safety checks.

Restraints/seclusion/emergency medication: N/A

Justification of Continued Inpatient Treatment: Pt has a history of catatonic episodes, he 
is currently nonverbal and gravely disabled. He needs therapeutic support and medication 
management to provide stabilization and return to baseline level of function. If pt does not 
return to previous baseline level of functioning, he may need a higher level of care.

## 2019-09-21 NOTE — NUR
HS blood glucose finger stick was administered @ 2120 BG was 473. ZACHERY noriega hospitalist on 
call Dr. Behl. Received a verbal order to give 12 units of Humalog insulin. Fingerstick was 
readministered 15 mins later BG was 351.

## 2019-09-22 VITALS — SYSTOLIC BLOOD PRESSURE: 129 MMHG | DIASTOLIC BLOOD PRESSURE: 66 MMHG

## 2019-09-22 VITALS — SYSTOLIC BLOOD PRESSURE: 126 MMHG

## 2019-09-22 VITALS — SYSTOLIC BLOOD PRESSURE: 192 MMHG | DIASTOLIC BLOOD PRESSURE: 76 MMHG

## 2019-09-22 RX ADMIN — Medication SCH CUP: at 17:30

## 2019-09-22 RX ADMIN — DOCUSATE SODIUM SCH MG: 100 CAPSULE, LIQUID FILLED ORAL at 21:29

## 2019-09-22 RX ADMIN — Medication SCH MMU: at 07:21

## 2019-09-22 RX ADMIN — DIVALPROEX SODIUM SCH MG: 250 TABLET, EXTENDED RELEASE ORAL at 08:43

## 2019-09-22 RX ADMIN — INSULIN HUMAN SCH UNIT: 100 INJECTION, SOLUTION PARENTERAL at 17:05

## 2019-09-22 RX ADMIN — Medication SCH CUP: at 12:30

## 2019-09-22 RX ADMIN — MESALAMINE SCH GM: 1.2 TABLET, DELAYED RELEASE ORAL at 12:47

## 2019-09-22 RX ADMIN — MESALAMINE SCH GM: 1.2 TABLET, DELAYED RELEASE ORAL at 16:52

## 2019-09-22 RX ADMIN — Medication SCH MCG: at 07:21

## 2019-09-22 RX ADMIN — MESALAMINE SCH GM: 1.2 TABLET, DELAYED RELEASE ORAL at 07:24

## 2019-09-22 RX ADMIN — PANTOPRAZOLE SODIUM SCH MG: 40 TABLET, DELAYED RELEASE ORAL at 07:22

## 2019-09-22 RX ADMIN — Medication SCH MMU: at 21:29

## 2019-09-22 RX ADMIN — DOCUSATE SODIUM SCH MG: 100 CAPSULE, LIQUID FILLED ORAL at 08:00

## 2019-09-22 RX ADMIN — THERA TABS SCH EACH: TAB at 07:22

## 2019-09-22 RX ADMIN — Medication SCH MG: at 07:22

## 2019-09-22 RX ADMIN — INSULIN HUMAN SCH UNIT: 100 INJECTION, SOLUTION PARENTERAL at 13:27

## 2019-09-22 RX ADMIN — Medication PRN G: at 07:39

## 2019-09-22 RX ADMIN — INSULIN HUMAN SCH UNIT: 100 INJECTION, SOLUTION PARENTERAL at 08:35

## 2019-09-22 RX ADMIN — INSULIN GLARGINE SCH UNIT: 100 INJECTION, SOLUTION SUBCUTANEOUS at 21:38

## 2019-09-22 NOTE — NUR
NURSING PROGRESS REPORT: 



Legal hold: Voluntary



Client on voluntary status for GD.



Report received from PITO Gabriel with use of SBAR.



Why are they here:  Pt. admitted for a mental health evaluation. His staff of the [facility] residence he lives at in [place] stated Mr. Patton has been refusing to care for 
himself for the past week. According to staff, Mr. Patton has not been eating or drinking, 
and has not been taking his medications. Per staff, Mr. Patton has schizophrenia and has 
gone into catatonic states in the past associated with his diabetes. Mr. Patton was seen at 
[facility] prior to this admission and prescribed antibiotics for possible pneumonia. 



Assessment





What has happened this shift:  Pt is observed sitting in the group room with eyes closed, pt 
is sitting on prescribed Christian cushion. Pt opens his eyes when his name is called, but does 
not use words. Pt is assisted to the toilet and brought back to group for HS snack. Pt is 
medication compliant and 1:1 is completed at bedside. Pt is offered flavored water and he 
drinks it. Pt's Colace was held due to loose stool. Wound on coccyx was assessed and drsg 
was changed on day shift and is CDI; skin around coccyx is blanchable. Falguni care was 
performed and  barrier cream was applied x2 as of this writing. Pt is being turned q2h while 
in bed. Pt's HS blood glucose was 473. Hospitalist was paged and 12 units of Humalog insulin 
was ordered and administered - BG repeated was 351. Pt received 15 units of scheduled 
Lantus.

Pt. requires much time to respond to directions, but will complete most tasks.  





S/I, H/I: Unable to assess, no unsafe behaviors noted

A/VH:  Pt is nonverbal, no indications of response to internal stimuli.

Sleep: See sleep assessment notation.

ADL's: Pt needs a one person assist with all ADLs. He responds well to task orientation and 
is able to follow simple 1 to 2 step commands. He is a total assist with feeding, so far, he 
will not even hold a cup in his hand. He requires prompting and stand by assist for 
transfers, toileting and ambulation, he needs help with wiping. If he is not toileted 
routinely, he is incontinent of bowel and bladder, he is nonverbal and unable to make his 
needs known at this time.

Group attendance: Night shift, no group

Were meds taken: Medication compliant

Any med S/E: None observed



Mental Status Exam



Appearance: Slow moving, nonverbal elderly gentleman with dentures and cropped white hair 
dressed in green hospital scrubs. 

Eye contact: Fair. Opens eyes when name is called.

Behavior: Slow moving, mildly agitated, cooperative

Speech: Minimal. Responds with "yes" and "no"

Mood: Agitated with a few moments of cheerfulness and smiles. 

Affect: Flat with moments of brightening. 

Thought process: Unable to assess

Thought Content: Unable to assess

Cognition: A&Ox1

Insight: Poor

Judgment: Poor



Interventions



PRN's used: None



Therapeutic interventions: 1:1 assessment, task orientation; simple 1 to 2 step directions, 
positive reinforcement, swallow precautions, fall precautions, one person assist with 
ambulation, toileting, bathing, and feeding, medication administration/monitoring/education, 
routine toileting, wound care dressing change and monitoring, falguni care; Q15 min safety 
checks.



Restraints/seclusion/emergency medication: N/A



Justification of Continued Inpatient Treatment: Pt has a history of catatonic episodes, he 
is currently nonverbal and gravely disabled. He needs therapeutic support and medication 
management to provide stabilization and return to baseline level of function. If pt does not 
return to previous baseline level of functioning, he may need a higher level of care.

-------------------------------------------------------------------------------

Addendum: 09/22/19 at 0504 by Cristela Hendrix RN

-------------------------------------------------------------------------------

Be aware of pt's elbows while in bed. Floated elbows on pillow over night.

## 2019-09-22 NOTE — NUR
NURSING PROGRESS REPORT:

Legal hold: Voluntary

Client on voluntary status for GD.

Report received from Madhavi SHELDON with use of SBAR.

Why are they here:  Pt. admitted for a mental health evaluation. His staff of the [facility] residence he lives at in [place] stated Mr. Patton has been refusing to care for 
himself for the past week. According to staff, Mr. Patton has not been eating or drinking, 
and has not been taking his medications. Per staff, Mr. Patton has schizophrenia and has 
gone into catatonic states in the past associated with his diabetes. Mr. Patton was seen at 
[facility] prior to this admission and prescribed antibiotics for possible pneumonia. 

Assessment

What has happened this shift: 

Patient was found in bed at change of shift.  Blood sugar taken which was 57. Given oral 
glucose.  Blood suger rechecked and now is 75. Redressed wound and cleaned tootie area He is 
mostly nonverbal.  He did answer several questions with yes or no that were closed ended 
questions.  He will nod or shake his head. He ate all of the food that was served to him 
although it took him an extraordinarily long time to finish his food.  He required some 
prompting and cutting of his food into smaller bites.  He ambulates well using a walker.  He 
is able to swallow his medications.  He offers his finger for blood glucose checks.  He 
tires easily and affirms that he has all over pain.  

S/I, H/I: Unable to assess

A/VH:  Unable to assess.

Sleep: Currently sleeping. See sleep assessment notation.

ADL's: Needs assistance. Pt uses a FWW, incontinent x1

Group attendance: Yes

Were meds taken: Yes

Any med S/E: None reported or observed.

Mental Status Exam

Appearance: Disheveled, wearing green unit scrubs,

Eye contact: Poor

Behavior: Cooperative, delayed

Speech: Soft, minimal, delayed

Mood: Unable to assess

Affect: blunted

Thought process: Unable to assess

Thought Content: Unable to assess

Cognition: A&O to self and place (knew he was in [place])

Insight: Unable to assess

Judgment: Unable to assess

Interventions

PRN's used: Lyricaand Tylenol

Therapeutic interventions: 1:1 therapeutic assessment, medication 
administration/education/monitoring, provided clear/simple instructions, Q15 min safety 
checks.

Restraints/seclusion/emergency medication: N/A

Justification of Continued Inpatient Treatment: Pt is gravely disabled and needs therapeutic 
support and medication management to provide stabilization.

## 2019-09-23 VITALS — DIASTOLIC BLOOD PRESSURE: 71 MMHG | SYSTOLIC BLOOD PRESSURE: 157 MMHG

## 2019-09-23 VITALS — DIASTOLIC BLOOD PRESSURE: 74 MMHG | SYSTOLIC BLOOD PRESSURE: 159 MMHG

## 2019-09-23 RX ADMIN — Medication SCH MCG: at 07:36

## 2019-09-23 RX ADMIN — INSULIN HUMAN SCH UNIT: 100 INJECTION, SOLUTION PARENTERAL at 13:02

## 2019-09-23 RX ADMIN — MESALAMINE SCH GM: 1.2 TABLET, DELAYED RELEASE ORAL at 17:40

## 2019-09-23 RX ADMIN — MESALAMINE SCH GM: 1.2 TABLET, DELAYED RELEASE ORAL at 07:35

## 2019-09-23 RX ADMIN — INSULIN HUMAN SCH UNIT: 100 INJECTION, SOLUTION PARENTERAL at 07:46

## 2019-09-23 RX ADMIN — PANTOPRAZOLE SODIUM SCH MG: 40 TABLET, DELAYED RELEASE ORAL at 07:40

## 2019-09-23 RX ADMIN — THERA TABS SCH EACH: TAB at 07:35

## 2019-09-23 RX ADMIN — Medication SCH MG: at 07:35

## 2019-09-23 RX ADMIN — Medication SCH MMU: at 20:15

## 2019-09-23 RX ADMIN — INSULIN HUMAN SCH UNIT: 100 INJECTION, SOLUTION PARENTERAL at 17:39

## 2019-09-23 RX ADMIN — MESALAMINE SCH GM: 1.2 TABLET, DELAYED RELEASE ORAL at 13:02

## 2019-09-23 RX ADMIN — Medication SCH MMU: at 07:35

## 2019-09-23 RX ADMIN — DOCUSATE SODIUM SCH MG: 100 CAPSULE, LIQUID FILLED ORAL at 07:36

## 2019-09-23 RX ADMIN — Medication PRN G: at 07:14

## 2019-09-23 RX ADMIN — DOCUSATE SODIUM SCH MG: 100 CAPSULE, LIQUID FILLED ORAL at 20:15

## 2019-09-23 RX ADMIN — DIVALPROEX SODIUM SCH MG: 250 TABLET, EXTENDED RELEASE ORAL at 07:40

## 2019-09-23 RX ADMIN — INSULIN GLARGINE SCH UNIT: 100 INJECTION, SOLUTION SUBCUTANEOUS at 20:22

## 2019-09-23 NOTE — NUR
NURSING PROGRESS REPORT:



Legal hold: Voluntary



Client on voluntary status for GD.



Report received from Karina SHELDON with use of SBAR.



Why are they here:  Pt. admitted for a mental health evaluation. His staff of the [facility] residence he lives at in [place] stated Mr. Patton has been refusing to care for 
himself for the past week. According to staff, Mr. Patton has not been eating or drinking, 
and has not been taking his medications. Per staff, Mr. Patton has schizophrenia and has 
gone into catatonic states in the past associated with his diabetes. Mr. Patton was seen at 
[facility] prior to this admission and prescribed antibiotics for possible pneumonia. 



Assessment

What has happened this shift: Patient in bed awake at change of shift. Patient is in a 
pleasant mood today.  He is walking with walker and standby assist.  Patient went outside 
during group, is eating his food independently.  Patient has been out of bed during the day.

 

S/I, H/I: Unable to assess

A/VH:  Unable to assess.

Sleep: 7.0 hrs. NOC.

ADL's: Needs assistance. Pt uses a FWW

Group attendance: Yes

Were meds taken: Yes

Any med S/E: None reported or observed.

Mental Status Exam

Appearance: Disheveled, wearing green unit scrubs,

Eye contact: Poor

Behavior: Cooperative, delayed

Speech: Soft, minimal, delayed

Mood: Good.

Affect: blunted

Thought process: Unable to assess

Thought Content: Unable to assess

Cognition: A&O to self and place (knew he was in [place])

Insight: Unable to assess

Judgment: Unable to assess

Interventions

PRN's used: None

Therapeutic interventions: 1:1 therapeutic assessment, medication 
administration/education/monitoring, provided clear/simple instructions, Q15 min safety 
checks.

Restraints/seclusion/emergency medication: N/A

Justification of Continued Inpatient Treatment: Pt is gravely disabled and needs therapeutic 
support and medication management to provide stabilization.

## 2019-09-23 NOTE — NUR
NURSING PROGRESS REPORT:

Legal hold: Voluntary

Client on voluntary status for GD.

Report received from Harvey SHELDON with use of SBAR.



Why are they here:  Pt. admitted for a mental health evaluation. His staff of the [facility] residence he lives at in [place] stated Mr. Patton has been refusing to care for 
himself for the past week. According to staff, Mr. Patton has not been eating or drinking, 
and has not been taking his medications. Per staff, Mr. Patton has schizophrenia and has 
gone into catatonic states in the past associated with his diabetes. Mr. Patton was seen at 
[facility] prior to this admission and prescribed antibiotics for possible pneumonia. 





Assessment

What has happened this shift: 



Patient up in group room at start of shift.  Pt continues to become more and more 
responsive.   Answers simple question with Yes or no.  Geting hp out of chir without assist. 
 Ambulated all the way to his room with a stand by assist.  Good eye contact, smiled 
appropriately and was able with prompting to feed himself.     Blood sugar taken which was 
382.   



No incontinence so far this shift.    Given supp result large soft formed stool.   Falguni care 
done. Pt has a open area sacrum, drsing clean dry and intact.   Pt turned Q2h and toileted  
Q4h.   





S/I, H/I: Unable to assess

A/VH:  Unable to assess.

Sleep: Currently sleeping. 

ADL's: Needs assistance. Pt uses a FWW

Group attendance: Yes

Were meds taken: Yes

Any med S/E: None reported or observed.

Mental Status Exam

Appearance: Well groomed wearing green unit scrubs,

Eye contact: Fair

Behavior: Cooperative, delayed

Speech: Soft, minimal, slow

Mood: Unable to assess

Affect: blunted

Thought process: Unable to assess

Thought Content: Unable to assess

Cognition: A&O to self and place (knew he was in [place])

Insight: Unable to assess

Judgment: Unable to assess

Interventions

PRN's used: none

Therapeutic interventions: 1:1 therapeutic assessment, medication 
administration/education/monitoring, provided clear/simple instructions, Q15 min safety 
checks.

Restraints/seclusion/emergency medication: N/A

Justification of Continued Inpatient Treatment: Pt is gravely disabled and needs therapeutic 
support and medication management to provide stabilization.

## 2019-09-23 NOTE — NUR
NURSING PROGRESS REPORT:

Legal hold: Voluntary

Client on voluntary status for GD.

Report received from Harvey SHELDON with use of SBAR.

Why are they here:  Pt. admitted for a mental health evaluation. His staff of the [facility] residence he lives at in [place] stated Mr. Patton has been refusing to care for 
himself for the past week. According to staff, Mr. Patton has not been eating or drinking, 
and has not been taking his medications. Per staff, Mr. Patton has schizophrenia and has 
gone into catatonic states in the past associated with his diabetes. Mr. Patton was seen at 
[facility] prior to this admission and prescribed antibiotics for possible pneumonia. 

Assessment

What has happened this shift: 



Patient up in group room at start of shift.  Pt more responsive this week than last, 
answered  question, yes.  Good eye contact, smiled appropriately and was able with prompting 
to feed himself.     Blood sugar taken which was 350. Another pt had  given  pt snack before 
blood sugar taken which might have contributed to high result.  



He had been incontinent of urine at start of shift.    Falguni care done. Pt has a open area 
sacrum.  Cleaned with NS foam drsing applied.  Pt turned Q2h and toileted  Q4h.  Has been 
continent of urine other than at start of shift.  





S/I, H/I: Unable to assess

A/VH:  Unable to assess.

Sleep: Currently sleeping. 

ADL's: Needs assistance. Pt uses a FWW, incontinent x1

Group attendance: Yes

Were meds taken: Yes

Any med S/E: None reported or observed.

Mental Status Exam

Appearance: Well groomed wearing green unit scrubs,

Eye contact: Fair

Behavior: Cooperative, delayed

Speech: Soft, minimal, slow

Mood: Unable to assess

Affect: blunted

Thought process: Unable to assess

Thought Content: Unable to assess

Cognition: A&O to self and place (knew he was in [place])

Insight: Unable to assess

Judgment: Unable to assess

Interventions

PRN's used: none

Therapeutic interventions: 1:1 therapeutic assessment, medication 
administration/education/monitoring, provided clear/simple instructions, Q15 min safety 
checks.

Restraints/seclusion/emergency medication: N/A

Justification of Continued Inpatient Treatment: Pt is gravely disabled and needs therapeutic 
support and medication management to provide stabilization.

## 2019-09-24 VITALS — SYSTOLIC BLOOD PRESSURE: 148 MMHG | DIASTOLIC BLOOD PRESSURE: 67 MMHG

## 2019-09-24 VITALS — SYSTOLIC BLOOD PRESSURE: 167 MMHG | DIASTOLIC BLOOD PRESSURE: 72 MMHG

## 2019-09-24 RX ADMIN — MESALAMINE SCH GM: 1.2 TABLET, DELAYED RELEASE ORAL at 07:28

## 2019-09-24 RX ADMIN — Medication SCH MG: at 07:30

## 2019-09-24 RX ADMIN — INSULIN HUMAN SCH UNIT: 100 INJECTION, SOLUTION PARENTERAL at 17:00

## 2019-09-24 RX ADMIN — INSULIN HUMAN SCH UNIT: 100 INJECTION, SOLUTION PARENTERAL at 08:05

## 2019-09-24 RX ADMIN — INSULIN GLARGINE SCH UNIT: 100 INJECTION, SOLUTION SUBCUTANEOUS at 20:04

## 2019-09-24 RX ADMIN — MESALAMINE SCH GM: 1.2 TABLET, DELAYED RELEASE ORAL at 13:22

## 2019-09-24 RX ADMIN — DIVALPROEX SODIUM SCH MG: 250 TABLET, EXTENDED RELEASE ORAL at 08:29

## 2019-09-24 RX ADMIN — PANTOPRAZOLE SODIUM SCH MG: 40 TABLET, DELAYED RELEASE ORAL at 07:29

## 2019-09-24 RX ADMIN — Medication SCH MMU: at 19:54

## 2019-09-24 RX ADMIN — Medication SCH MMU: at 07:29

## 2019-09-24 RX ADMIN — DOCUSATE SODIUM SCH MG: 100 CAPSULE, LIQUID FILLED ORAL at 19:54

## 2019-09-24 RX ADMIN — DOCUSATE SODIUM SCH MG: 100 CAPSULE, LIQUID FILLED ORAL at 07:29

## 2019-09-24 RX ADMIN — MESALAMINE SCH GM: 1.2 TABLET, DELAYED RELEASE ORAL at 18:29

## 2019-09-24 RX ADMIN — THERA TABS SCH EACH: TAB at 07:29

## 2019-09-24 RX ADMIN — INSULIN HUMAN SCH UNIT: 100 INJECTION, SOLUTION PARENTERAL at 18:57

## 2019-09-24 RX ADMIN — Medication SCH MCG: at 07:30

## 2019-09-24 RX ADMIN — INSULIN HUMAN SCH UNIT: 100 INJECTION, SOLUTION PARENTERAL at 12:37

## 2019-09-24 NOTE — NUR
1:1 SS spoke with Bing at [facility]. Bing from [facility] will be meeting with pt. on 
Tuesday Oct. 1st at 2 pm. 



Bing ( [employer] representative)  also noted she will be bringing social security 
paperwork to [facility] for pt. signature no later then Thursday Sept. 26 th.

## 2019-09-24 NOTE — NUR
PRESSURE ULCER EDUCATION:



DEFINITION:  A pressure ulcer is an area of skin that breaks down when you stay in one 
position too long. The constant pressure against the skin reduces the blood flow to that 
area and the affected tissue dies.



CAUSES:

"Being bedridden or in a wheelchair

"Fragile skin

"Having a chronic condition, such as diabetes or vascular disease

"Inability to move certain parts of your body without assistance

"Older age

"Incontinence of urine or stool



SYMPTOMS:

"A reddened area that DOES NOT turn white when pressed on - this can be the beginning of a 
pressure ulcer

"A blister, deep sore or a crater - these can be advanced pressure ulcers



FIRST AID:

"Relieve the pressure on this area

"Keep the area clean and dry

"Call your primary doctor if you see any of the above symptoms

"DO NOT massage the area

"DO NOT use a donut shaped or ring shaped pillow- these actually interfere with the blood 
flow and cause complications



PREVENTION:

"Check for pressure ulcers everyday

"Change position at least every two hours to relieve pressure

"Use items that help relieve pressure- pillows, sheepskin, foam padding, and powders.

"Keep skin clean and dry

"Eat healthy well balanced meals

"Exercise daily



IF YOU SEE ANY OF THESE SYMPTOMS WHILE IN THE HOSPITAL - TELL YOUR NURSE IMMEDIATELY.



IF YOU SEE ANY OF THESE SYMPTOMS WHILE AT HOME OR HAVE ANY QUESTIONS OR CONCERNS  ABOUT 
PRESSURE ULCERS - CALL YOUR PRIMARY DOCTOR IMMEDIATELY.


-------------------------------------------------------------------------------

Addendum: 09/24/19 at 1422 by Aline Uriarte RN

-------------------------------------------------------------------------------

Amended: Links added.

## 2019-09-24 NOTE — NUR
1:1 SS worker called  denver Broderick at [facility] [phone] @ [phone] to coordinate f/u appointments

## 2019-09-24 NOTE — NUR
NURSING PROGRESS REPORT:



Legal hold: Voluntary



Client on voluntary status for GD.



Report received from ZACHERY Locke with use of SBAR.



Why are they here:  Pt. admitted for a mental health evaluation. His staff of the [facility] where in lives in [place] stated Mr. Patton has been refusing to care 
for himself for the past week. According to staff, Mr. Patton has not been eating or 
drinking, and has not been taking his medications. Per staff, Mr. Patton has schizophrenia 
and has gone into catatonic states in the past associated with his diabetes. Mr. Patton was 
seen at [facility] prior to this admission and prescribed antibiotics for possible 
pneumonia. 



Assessment



What has happened this shift: Pt in his bed at start of shift. Accu check completed; morning 
Accu check 264, Patient given his morning medications with applesauce. Patient up for meals 
then walked the halls a few times using his walker. Pt has been communicating with staff 
talking about his "Starfish" during group engaging in other conversations as well. Wound 
Care nurse here changed dressings, applied ordered creams and spray and wrote new orders. 
Noon Accu check 286 insulin given prior to each meal. 



S/I, H/I: Unable to assess

A/VH:  Unable to assess.

Sleep: Brief naps between meals or groups  

ADL's: Pt uses a FWW

Group attendance: Yes

Were Meds taken: Yes

Any med S/E: None reported or observed.



Mental Status Exam



Appearance: Nicely groomed; wearing green unit scrubs

Eye contact: Fair

Behavior: Cooperative, delayed

Speech: Soft, minimal, slow

Mood: Unable to assess

Affect: blunted

Thought process: Unable to assess

Thought Content: Unable to assess

Cognition: A&O to self and place (knew he was in [place])

Insight: Unable to assess

Judgment: Unable to assess



Interventions



PRN's used: none



Therapeutic interventions: 1:1 therapeutic assessment, encouraged compliance with accu 
checks and medications as well as prompted to attend groups, medication 
administration/education/monitoring, provided clear/simple instructions, Q15 min safety 
checks.



Restraints/seclusion/emergency medication: N/A



Justification of Continued Inpatient Treatment: Pt is gravely disabled and needs therapeutic 
support and medication management to provide stabilization.

-------------------------------------------------------------------------------

Addendum: 09/24/19 at 1814 by Yissel Morgan RN

-------------------------------------------------------------------------------

Cleared the red for wound care #1 dated 09/21

-------------------------------------------------------------------------------

Addendum: 09/24/19 at 1821 by Yissel Morgan RN

-------------------------------------------------------------------------------

Pt refused rebecca insulin held per orders

## 2019-09-25 VITALS — DIASTOLIC BLOOD PRESSURE: 80 MMHG | SYSTOLIC BLOOD PRESSURE: 156 MMHG

## 2019-09-25 VITALS — DIASTOLIC BLOOD PRESSURE: 73 MMHG | SYSTOLIC BLOOD PRESSURE: 115 MMHG

## 2019-09-25 RX ADMIN — INSULIN GLARGINE SCH UNIT: 100 INJECTION, SOLUTION SUBCUTANEOUS at 21:00

## 2019-09-25 RX ADMIN — Medication SCH MCG: at 08:00

## 2019-09-25 RX ADMIN — DIVALPROEX SODIUM SCH MG: 250 TABLET, EXTENDED RELEASE ORAL at 11:20

## 2019-09-25 RX ADMIN — Medication SCH MMU: at 08:00

## 2019-09-25 RX ADMIN — MESALAMINE SCH GM: 1.2 TABLET, DELAYED RELEASE ORAL at 12:00

## 2019-09-25 RX ADMIN — INSULIN HUMAN SCH UNIT: 100 INJECTION, SOLUTION PARENTERAL at 12:59

## 2019-09-25 RX ADMIN — THERA TABS SCH EACH: TAB at 08:00

## 2019-09-25 RX ADMIN — PANTOPRAZOLE SODIUM SCH MG: 40 TABLET, DELAYED RELEASE ORAL at 08:00

## 2019-09-25 RX ADMIN — DOCUSATE SODIUM SCH MG: 100 CAPSULE, LIQUID FILLED ORAL at 20:59

## 2019-09-25 RX ADMIN — Medication SCH MG: at 08:00

## 2019-09-25 RX ADMIN — MESALAMINE SCH GM: 1.2 TABLET, DELAYED RELEASE ORAL at 17:45

## 2019-09-25 RX ADMIN — Medication SCH MMU: at 20:59

## 2019-09-25 RX ADMIN — INSULIN HUMAN SCH UNIT: 100 INJECTION, SOLUTION PARENTERAL at 17:00

## 2019-09-25 RX ADMIN — DOCUSATE SODIUM SCH MG: 100 CAPSULE, LIQUID FILLED ORAL at 08:00

## 2019-09-25 RX ADMIN — MESALAMINE SCH GM: 1.2 TABLET, DELAYED RELEASE ORAL at 07:00

## 2019-09-25 NOTE — NUR
DISCHARGE PLANNING: BRENNAN escalante/Barb at [facility] asking where to make f/u appointments

## 2019-09-25 NOTE — NUR
Nursing Progress Note:



Legal hold: Voluntary



Client on voluntary status for GD.



Report received from ZACHERY Gabriel with use of SBAR.



Why are they here:  Pt. admitted for a mental health evaluation. The staff of the [facility] where he lives in [place] stated Mr. Patton has been refusing to care 
for himself for the past week. According to staff, Mr. Patton has not been eating or 
drinking, and has not been taking his medications. Per staff, Mr. Patton has Schizophrenia 
and has gone into catatonic states in the past associated with his diabetes. Mr. Patton was 
seen at [facility] prior to this admission and prescribed antibiotics for possible 
pneumonia. 



Assessment



What has happened this shift: The patient was seen finishing dinner in the Group room. It 
took him a while to get going, but he ate pretty quickly once he started. When he finished 
eating, the patient came to me and asked for his medication. The patient continues to 
respond to medication, and has been communicating better with staff. The patient was given 
HS meds then he went to bed.



S/I, H/I: Unable to assess

A/VH:  Unable to assess.

Sleep: Brief naps between meals or groups  

ADL's: Pt uses a FWW

Group attendance: No groups at night

Were Meds taken: Yes

Any med S/E: None reported or observed.



Mental Status Exam



Appearance: Nicely groomed; wearing green unit scrubs

Eye contact: Fair

Behavior: Cooperative, delayed

Speech: Delayed, thought blocking.

Mood: Unable to assess

Affect: blunted

Thought process: Unable to assess

Thought Content: Unable to assess

Cognition: A&O to self and place (knew he was in [place])

Insight: Unable to assess

Judgment: Unable to assess



Interventions



PRN's used: none



Therapeutic interventions: 1:1 therapeutic assessment, encouraged compliance with accu 
checks and medications as well as prompted to attend groups, medication 
administration/education/monitoring, provided clear/simple instructions, Q15 min safety 
checks.



Restraints/seclusion/emergency medication: N/A



Justification of Continued Inpatient Treatment: Pt is gravely disabled and needs therapeutic 
support and medication management to provide stabilization.

## 2019-09-25 NOTE — NUR
NURSING PROGRESS REPORT:



Legal hold: Voluntary



Client on voluntary status for GD.



Report received from ZACHERY Locke with use of SBAR.



Why are they here:  Pt. admitted for a mental health evaluation. His staff of the [facility] where in lives in [place] stated Mr. Patton has been refusing to care 
for himself for the past week. According to staff, Mr. Patton has not been eating or 
drinking, and has not been taking his medications. Per staff, Mr. Patton has schizophrenia 
and has gone into catatonic states in the past associated with his diabetes. Mr. Patton was 
seen at [facility] prior to this admission and prescribed antibiotics for possible 
pneumonia. 



Assessment



What has happened this shift: 

Pt woke for v/s and blood sugar, but then became resistive to everything and seemed upset. 
Pt would not get up on his own for the bathroom was visibly upset. Pt was escorted to the 
bathroom and drsg changed on his coccyx. Pt placed in wheelchair and brought to breakfast. 
Pt refused breakfast. After being left alone for a couple of hours, pt agreeable to take am 
psych meds and seemed more agreeable. Pt was compliant with meds throughout the day, but 
refused lunch even after tech attempted to help. Provider spoke with pt and seems like he 
got up when tech touched him this morning to get his vital signs.





S/I, H/I: Unable to assess

A/VH:  Unable to assess.

Sleep: Brief naps between meals or groups  

ADL's: Pt uses a FWW

Group attendance: Yes

Were Meds taken: Yes

Any med S/E: None reported or observed.



Mental Status Exam



Appearance: Nicely groomed; wearing green unit scrubs

Eye contact: Fair

Behavior: Cooperative, delayed

Speech: Soft, minimal, slow

Mood: Unable to assess

Affect: blunted

Thought process: Unable to assess

Thought Content: Unable to assess

Cognition: A&O to self and place (knew he was in [place])

Insight: Unable to assess

Judgment: Unable to assess



Interventions



PRN's used: none



Therapeutic interventions: 1:1 therapeutic assessment, encouraged compliance with accu 
checks and medications as well as prompted to attend groups, medication 
administration/education/monitoring, provided clear/simple instructions, Q15 min safety 
checks.



Restraints/seclusion/emergency medication: N/A



Justification of Continued Inpatient Treatment: Pt is gravely disabled and needs therapeutic 
support and medication management to provide stabilization.

## 2019-09-26 VITALS — SYSTOLIC BLOOD PRESSURE: 146 MMHG | DIASTOLIC BLOOD PRESSURE: 69 MMHG

## 2019-09-26 VITALS — SYSTOLIC BLOOD PRESSURE: 134 MMHG | DIASTOLIC BLOOD PRESSURE: 72 MMHG

## 2019-09-26 RX ADMIN — DIVALPROEX SODIUM SCH MG: 250 TABLET, EXTENDED RELEASE ORAL at 09:46

## 2019-09-26 RX ADMIN — MESALAMINE SCH GM: 1.2 TABLET, DELAYED RELEASE ORAL at 12:00

## 2019-09-26 RX ADMIN — Medication SCH MG: at 08:00

## 2019-09-26 RX ADMIN — THERA TABS SCH EACH: TAB at 08:00

## 2019-09-26 RX ADMIN — DOCUSATE SODIUM SCH MG: 100 CAPSULE, LIQUID FILLED ORAL at 21:03

## 2019-09-26 RX ADMIN — INSULIN HUMAN SCH UNIT: 100 INJECTION, SOLUTION PARENTERAL at 07:00

## 2019-09-26 RX ADMIN — Medication SCH MMU: at 08:00

## 2019-09-26 RX ADMIN — Medication SCH CUP: at 12:30

## 2019-09-26 RX ADMIN — INSULIN HUMAN SCH UNIT: 100 INJECTION, SOLUTION PARENTERAL at 12:00

## 2019-09-26 RX ADMIN — DOCUSATE SODIUM SCH MG: 100 CAPSULE, LIQUID FILLED ORAL at 08:00

## 2019-09-26 RX ADMIN — Medication SCH MCG: at 08:00

## 2019-09-26 RX ADMIN — MESALAMINE SCH GM: 1.2 TABLET, DELAYED RELEASE ORAL at 07:00

## 2019-09-26 RX ADMIN — INSULIN HUMAN SCH UNIT: 100 INJECTION, SOLUTION PARENTERAL at 17:33

## 2019-09-26 RX ADMIN — INSULIN GLARGINE SCH UNIT: 100 INJECTION, SOLUTION SUBCUTANEOUS at 21:10

## 2019-09-26 RX ADMIN — Medication SCH MMU: at 21:03

## 2019-09-26 RX ADMIN — PANTOPRAZOLE SODIUM SCH MG: 40 TABLET, DELAYED RELEASE ORAL at 08:00

## 2019-09-26 RX ADMIN — MESALAMINE SCH GM: 1.2 TABLET, DELAYED RELEASE ORAL at 17:00

## 2019-09-26 NOTE — NUR
NURSING PROGRESS REPORT: 



Legal hold: Voluntary



Client on voluntary status for GD.



Report received from ZACHERY Locke with use of SBAR.



Why are they here:  Pt. admitted for a mental health evaluation. His staff of the [facility] residence he lives at in [place] stated Mr. Patton has been refusing to care for 
himself for the past week. According to staff, Mr. Patton has not been eating or drinking, 
and has not been taking his medications. Per staff, Mr. Patton has schizophrenia and has 
gone into catatonic states in the past associated with his diabetes. Mr. Patton was seen at 
[facility] prior to this admission and prescribed antibiotics for possible pneumonia. 



Assessment





What has happened this shift:  

The patient was asleep at change of shift. He was cranky and unreasonable this morning and 
early afternoon.  He refused to eat breakfast or lunch even with much encouragement and 
help. Insulin was held. He stayed up all morning and throughout the lunch period. He was 
then returned to his bed to rest and admitted to nurse he was having pain "all over" when 
offered medication he stated, "why, it won't help". He was given lyrica prn and was then 
able to rest, he was repositioned as ordered and all dressing changes were completed, heels 
floated, turned, etc.  He allowed nurse to hold his hand and comfort him this afternoon.



S/I, H/I: Unable to assess, no unsafe behaviors noted

A/VH: no indications of response to internal stimuli

Sleep: napped

ADL's: Pt needs a one person assist with all ADLs. He responds well to task orientation and 
is able to follow simple 1 to 2 step commands. He is a total assist with feeding, so far, he 
will not even hold a cup in his hand. He requires prompting and stand by assist for 
transfers, toileting and ambulation, he needs help with wiping. 

Group attendance: yes x1

Were meds taken: pysch meds only

Any med S/E: None observed





Mental Status Exam



Appearance: Slow moving, nonverbal elderly gentleman with dentures and cropped white hair 
dressed in green hospital scrubs. 

Eye contact: intense

Behavior: agitated, isolative

Speech: Non verbal 

Mood: appears irritable

Affect: congruent to mood

Thought process: Unable to assess

Thought Content: Unable to assess

Cognition: Unable to assess though pt responds to his name, seems to be at least A/O X 1

Insight: Unable to assess

Judgment: Unable to assess



Interventions



PRN's used: lyrica



Therapeutic interventions: 1:1 assessment, task orientation; simple 1 to 2 step directions, 
positive reinforcement, swallow precautions, fall precautions, one person assist with 
ambulation, toileting, bathing, and feeding, medication administration/monitoring/education, 
routine toileting, Q15 min safety checks.



Restraints/seclusion/emergency medication: N/A



Justification of Continued Inpatient Treatment: Pt has a history of catatonic episodes, he 
is currently nonverbal and gravely disabled. He needs therapeutic support and medication 
management to provide stabilization and return to baseline level of function. If pt does not 
return to previous baseline level of functioning, he may need a higher level of care.

## 2019-09-26 NOTE — NUR
NURSING PROGRESS REPORT: 



Legal hold: Voluntary



Client on voluntary status for GD.



Report received from ZACHERY Rivas with use of SBAR.



Why are they here:  Pt. admitted for a mental health evaluation. His staff of the [facility] residence he lives at in [place] stated Mr. Patton has been refusing to care for 
himself for the past week. According to staff, Mr. Patton has not been eating or drinking, 
and has not been taking his medications. Per staff, Mr. Patton has schizophrenia and has 
gone into catatonic states in the past associated with his diabetes. Mr. Patton was seen at 
[facility] prior to this admission and prescribed antibiotics for possible pneumonia. 



Assessment





What has happened this shift:  

Patient in the group room with PCT assisting patient to eat his dinner at the beginning of 
shift. He refused to eat dinner and also refused HS snack. Patient's HS BG 98 and scheduled 
Lantus held per doctor. Patient continues to be toileted by staff and q2h repositioning by 
staff. Heels in protective booties. Patient non verbal with this writer, starring with 
intense eye contact when asked questions. Patient compliant with medications.



S/I, H/I: Unable to assess, no unsafe behaviors noted

A/VH:  Pt is nonverbal, no indications of response to internal stimuli

Sleep: asleep at this time

ADL's: Pt needs a one person assist with all ADLs. He responds well to task orientation and 
is able to follow simple 1 to 2 step commands. He is a total assist with feeding, so far, he 
will not even hold a cup in his hand. He requires prompting and stand by assist for 
transfers, toileting and ambulation, he needs help with wiping. If he is not toileted 
routinely, he is incontinent of bowel and bladder, he is nonverbal and unable to make his 
needs known at this time.

Group attendance:no groups this shift

Were meds taken: Yes

Any med S/E: None observed





Mental Status Exam



Appearance: Slow moving, nonverbal elderly gentleman with dentures and cropped white hair 
dressed in green hospital scrubs. 

Eye contact: intense

Behavior: agitated, isolative

Speech: Non verbal 

Mood: appears irritable

Affect: congruent to mood

Thought process: Unable to assess

Thought Content: Unable to assess

Cognition: Unable to assess though pt responds to his name, seems to be at least A/O X 1

Insight: Unable to assess

Judgment: Unable to assess



Interventions



PRN's used: None



Therapeutic interventions: 1:1 assessment, task orientation; simple 1 to 2 step directions, 
positive reinforcement, swallow precautions, fall precautions, one person assist with 
ambulation, toileting, bathing, and feeding, medication administration/monitoring/education, 
routine toileting, Q15 min safety checks.



Restraints/seclusion/emergency medication: N/A



Justification of Continued Inpatient Treatment: Pt has a history of catatonic episodes, he 
is currently nonverbal and gravely disabled. He needs therapeutic support and medication 
management to provide stabilization and return to baseline level of function. If pt does not 
return to previous baseline level of functioning, he may need a higher level of care.

## 2019-09-26 NOTE — NUR
Nutrition consult re: patient refusing to eat. Per documentation is  

eating % of  pureed, carb controlled meals. Spoke with bedside RN, 

reports that patient  did not eat breakfast this morning or any meals 

yesterday. Per MD note pt reports that he doesn't have an appetite. 

Patient is also refusing medications. Recommend Glucerna with meals while 

patient's appetite is poor. Pressure area to coccyx is improving per RN. 

Will continue to follow. 

Recommendations:

1) Continue pureed/thin/CHO controlled diet per SLP recs 

2) Glucerna with meals 

3) Routine bowel care

4) Weekly wt

-------------------------------------------------------------------------------

Addendum: 09/26/19 at 1327 by Aida Null RD

-------------------------------------------------------------------------------

Amended: Links added.

## 2019-09-27 VITALS — SYSTOLIC BLOOD PRESSURE: 133 MMHG | DIASTOLIC BLOOD PRESSURE: 64 MMHG

## 2019-09-27 VITALS — SYSTOLIC BLOOD PRESSURE: 158 MMHG | DIASTOLIC BLOOD PRESSURE: 66 MMHG

## 2019-09-27 RX ADMIN — INSULIN LISPRO SCH UNITS: 100 INJECTION, SOLUTION INTRAVENOUS; SUBCUTANEOUS at 21:57

## 2019-09-27 RX ADMIN — PANTOPRAZOLE SODIUM SCH MG: 40 TABLET, DELAYED RELEASE ORAL at 07:55

## 2019-09-27 RX ADMIN — INSULIN HUMAN SCH UNIT: 100 INJECTION, SOLUTION PARENTERAL at 08:16

## 2019-09-27 RX ADMIN — DIVALPROEX SODIUM SCH MG: 250 TABLET, EXTENDED RELEASE ORAL at 07:55

## 2019-09-27 RX ADMIN — INSULIN HUMAN SCH UNIT: 100 INJECTION, SOLUTION PARENTERAL at 17:49

## 2019-09-27 RX ADMIN — MESALAMINE SCH GM: 1.2 TABLET, DELAYED RELEASE ORAL at 07:54

## 2019-09-27 RX ADMIN — MESALAMINE SCH GM: 1.2 TABLET, DELAYED RELEASE ORAL at 17:51

## 2019-09-27 RX ADMIN — DOCUSATE SODIUM SCH MG: 100 CAPSULE, LIQUID FILLED ORAL at 21:18

## 2019-09-27 RX ADMIN — INSULIN GLARGINE SCH UNIT: 100 INJECTION, SOLUTION SUBCUTANEOUS at 21:23

## 2019-09-27 RX ADMIN — MESALAMINE SCH GM: 1.2 TABLET, DELAYED RELEASE ORAL at 12:49

## 2019-09-27 RX ADMIN — Medication SCH MMU: at 07:55

## 2019-09-27 RX ADMIN — Medication SCH MG: at 07:54

## 2019-09-27 RX ADMIN — Medication SCH MMU: at 21:18

## 2019-09-27 RX ADMIN — THERA TABS SCH EACH: TAB at 07:56

## 2019-09-27 RX ADMIN — Medication SCH MCG: at 07:54

## 2019-09-27 RX ADMIN — Medication SCH CUP: at 12:30

## 2019-09-27 RX ADMIN — Medication SCH CUP: at 17:59

## 2019-09-27 RX ADMIN — Medication SCH CUP: at 10:42

## 2019-09-27 RX ADMIN — INSULIN HUMAN SCH UNIT: 100 INJECTION, SOLUTION PARENTERAL at 12:50

## 2019-09-27 RX ADMIN — DOCUSATE SODIUM SCH MG: 100 CAPSULE, LIQUID FILLED ORAL at 07:55

## 2019-09-27 NOTE — NUR
NURSING PROGRESS REPORT: 



Legal hold: Voluntary



Client on voluntary status for GD.



Report received from PITO Estrada with use of SBAR.



Why are they here: Pt. admitted for a mental health evaluation. His staff of the [facility] residence he lives at in [place] stated Mr. Patton has been refusing to care for 
himself for the past week. According to staff, Mr. Patton has not been eating or drinking, 
and has not been taking his medications. Per staff, Mr. Patton has schizophrenia and has 
gone into catatonic states in the past associated with his diabetes. Mr. Patton was seen at 
[facility] prior to this admission and prescribed antibiotics for possible pneumonia. 



Assessment



What has happened this shift:  

Patient was being assisted to bathroom on first introduction this morning. He was 
cooperative with staff and initially not very verbal. Patient seems to be steadier than 
previously observed. Encouraged toileting every 1-2 hours with position changes. Ambulated 
to community room with standby assist. Ate 100% of meal with assistance. Patient's dressing 
to his L. foot and coccyx were evaluated by wound care nurse and are CDI. Patients BG was 
149 before breakfast. Patient showered with assistance, dressing removed from left heel per 
wound care and coccyx dressing changed per order. Patient ambulated via FWW with standby 
assist to community room and engaged in a game with PCT. After winning Vi[employer], this writer 
advised patient he could probably feed himself lunch, which he did and ate 100%. Stayed in 
community room following lunch and continued to play ViVu and connect four with staff. 
After being out of bed for approximately 3 hours, patient requested to return for a nap. 
Boots applied to bilateral feet, turned to right side. Evaluated by hospitalist, IS ordered 
Q1H while awake.



S/I, H/I: Denies

A/VH: No indications of response to internal stimuli

Sleep: 9

ADL's: Showered with standby assist, reported to have been unsteady in shower.

Group attendance: No 

Were meds taken: Yes

Any med S/E: None observed



Mental Status Exam



Appearance: Slow moving, showered, cropped white hair dressed in green hospital scrubs. 

Eye contact: direct at times

Behavior: engaged in limited conversation with PCT and assigned RN

Speech: Slow, soft, deliberate

Mood: Appears depressed with some brightening

Affect: Congruent to mood

Thought process: Linear

Thought Content: tasks on the unit, enjoys winning games

Cognition: Unable to assess though pt responds to his name, seems to be at least A/O X 1

Insight: Unable to assess

Judgment: Unable to assess



Interventions



PRN's used: None



Therapeutic interventions: 1:1 assessment, task orientation; simple 1 to 2 step directions, 
positive reinforcement, swallow precautions, fall precautions, one person assist with 
ambulation, toileting, bathing, and feeding, medication administration/monitoring/education, 
routine toileting, Q15 min safety checks.



Restraints/seclusion/emergency medication: N/A



Justification of Continued Inpatient Treatment: Pt has a history of catatonic episodes, he 
is currently nonverbal and gravely disabled. He needs therapeutic support and medication 
management to provide stabilization and return to baseline level of function. If pt does not 
return to previous baseline level of functioning, he may need a higher level of care.

## 2019-09-27 NOTE — NUR
NURSING PROGRESS REPORT: 



Legal hold: Voluntary



Client on voluntary status for GD.



Report received from PITO Mooney with use of SBAR.



Why are they here:  Pt. admitted for a mental health evaluation. His staff of the [facility] residence he lives at in [place] stated Mr. Patton has been refusing to care for 
himself for the past week. According to staff, Mr. Patton has not been eating or drinking, 
and has not been taking his medications. Per staff, Mr. Patton has schizophrenia and has 
gone into catatonic states in the past associated with his diabetes. Mr. Patton was seen at 
[facility] prior to this admission and prescribed antibiotics for possible pneumonia. 



Assessment





What has happened this shift:  

Patient is up in group room at change of shift watching TV and finishing his milk from 
dinner. Patient ate 100% of his dinner this evening. He was cooperative with staff this 
evening but not very verbal, but did answer questions minimally, when it came to his comfort 
when being repositioned in bed. After finishing his milk patient was helped to his room were 
he was placed on the toilet so that he may use it before going to bed, tootie care provided, 
an assessment completed and patient helped into and positioned in bed on his left side with 
his heels floated. Patients dressing to his R. foot was changed 9/26 and is CDI, patients 
dressing to his coccyx was changed 9/26 and was CDI. Patients HS CBG was 366 his Lantus 15 
units was administered as ordered this evening. Patient is repositioned in bed Q2H this 
evening as ordered. 



S/I, H/I: Unable to assess, no unsafe behaviors noted

A/VH: No indications of response to internal stimuli

Sleep: Currently sleeping, see sleep assessment

ADL's: Pt needs a one person assist with all ADLs. He responds well to task orientation and 
is able to follow simple 1 to 2 step commands. He is a total assist with feeding, so far, he 
will not even hold a cup in his hand. He requires prompting and stand by assist for 
transfers, toileting and ambulation, he needs help with wiping. 

Group attendance: No groups this shift

Were meds taken: Yes

Any med S/E: None observed



Mental Status Exam



Appearance: Slow moving, nonverbal elderly gentleman with dentures and cropped white hair 
dressed in green hospital scrubs. 

Eye contact: Stare

Behavior: Isolative, minimal vocal responses

Speech: Minimal, to none

Mood: Appears depressed

Affect: Congruent to mood

Thought process: Unable to assess

Thought Content: Unable to assess

Cognition: Unable to assess though pt responds to his name, seems to be at least A/O X 1

Insight: Unable to assess

Judgment: Unable to assess



Interventions



PRN's used: None



Therapeutic interventions: 1:1 assessment, task orientation; simple 1 to 2 step directions, 
positive reinforcement, swallow precautions, fall precautions, one person assist with 
ambulation, toileting, bathing, and feeding, medication administration/monitoring/education, 
routine toileting, Q15 min safety checks.



Restraints/seclusion/emergency medication: N/A



Justification of Continued Inpatient Treatment: Pt has a history of catatonic episodes, he 
is currently nonverbal and gravely disabled. He needs therapeutic support and medication 
management to provide stabilization and return to baseline level of function. If pt does not 
return to previous baseline level of functioning, he may need a higher level of care.

## 2019-09-28 VITALS — DIASTOLIC BLOOD PRESSURE: 93 MMHG | SYSTOLIC BLOOD PRESSURE: 140 MMHG

## 2019-09-28 VITALS — SYSTOLIC BLOOD PRESSURE: 104 MMHG | DIASTOLIC BLOOD PRESSURE: 68 MMHG

## 2019-09-28 RX ADMIN — Medication SCH MMU: at 21:11

## 2019-09-28 RX ADMIN — Medication SCH CUP: at 12:58

## 2019-09-28 RX ADMIN — Medication SCH MMU: at 07:35

## 2019-09-28 RX ADMIN — INSULIN LISPRO SCH UNITS: 100 INJECTION, SOLUTION INTRAVENOUS; SUBCUTANEOUS at 13:50

## 2019-09-28 RX ADMIN — Medication SCH MCG: at 07:37

## 2019-09-28 RX ADMIN — DIVALPROEX SODIUM SCH MG: 250 TABLET, EXTENDED RELEASE ORAL at 07:44

## 2019-09-28 RX ADMIN — DOCUSATE SODIUM SCH MG: 100 CAPSULE, LIQUID FILLED ORAL at 21:11

## 2019-09-28 RX ADMIN — Medication SCH CUP: at 17:43

## 2019-09-28 RX ADMIN — PANTOPRAZOLE SODIUM SCH MG: 40 TABLET, DELAYED RELEASE ORAL at 07:36

## 2019-09-28 RX ADMIN — MESALAMINE SCH GM: 1.2 TABLET, DELAYED RELEASE ORAL at 17:38

## 2019-09-28 RX ADMIN — MESALAMINE SCH GM: 1.2 TABLET, DELAYED RELEASE ORAL at 12:50

## 2019-09-28 RX ADMIN — INSULIN LISPRO SCH UNITS: 100 INJECTION, SOLUTION INTRAVENOUS; SUBCUTANEOUS at 08:41

## 2019-09-28 RX ADMIN — THERA TABS SCH EACH: TAB at 07:37

## 2019-09-28 RX ADMIN — MESALAMINE SCH GM: 1.2 TABLET, DELAYED RELEASE ORAL at 07:35

## 2019-09-28 RX ADMIN — Medication SCH MG: at 07:35

## 2019-09-28 RX ADMIN — DOCUSATE SODIUM SCH MG: 100 CAPSULE, LIQUID FILLED ORAL at 07:36

## 2019-09-28 RX ADMIN — Medication SCH CAN: at 13:00

## 2019-09-28 RX ADMIN — Medication SCH CAN: at 17:37

## 2019-09-28 RX ADMIN — INSULIN LISPRO SCH UNITS: 100 INJECTION, SOLUTION INTRAVENOUS; SUBCUTANEOUS at 18:03

## 2019-09-28 RX ADMIN — Medication SCH CAN: at 08:00
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
## 2019-09-28 NOTE — NUR
NURSING PROGRESS REPORT: 



Legal hold: Voluntary



Client on voluntary status for GD.



Report received from PITO Rivas with use of SBAR.



Why are they here: Pt. admitted for a mental health evaluation. His staff of the [facility] residence he lives at in [place] stated Mr. Patton has been refusing to care for 
himself for the past week. According to staff, Mr. Patton has not been eating or drinking, 
and has not been taking his medications. Per staff, Mr. Patton has schizophrenia and has 
gone into catatonic states in the past associated with his diabetes. Mr. Patton was seen at 
[facility] prior to this admission and prescribed antibiotics for possible pneumonia. 



Assessment



What has happened this shift:  

Patient ambulating in hallway with FFW at change of shift. Gait appears steady with FFW and 
no difficulty ambulating is noted. Patient requests to use toilet at this time patient is 
watched while he ambulates and places himself on the toilet and then given privacy. Patient 
reports he is done and is observed getting up, and then ambulates with his FFW to his sink 
he is instructed to wash his hands which he does. He requests help taking his dentures out. 
Patient helps removes dentures and helps staff provide oral care, then he ambulates to his 
bedside where an assessment is done. Patients dressing to coccyx is CDI and dated 9/27, heel 
dressing are removed but no noticeable breakdown to bilateral heels is noted. He gets into 
bed and verbalizes that he needs to lay on his side this time, and he roles to his left side 
in bed. He is assisted to a comfortable position and booties are applied to float 
heels/feet. A while later patient notices staff passing his room and he requests standby 
assistance so he may use the restroom. He reports that his favorite part of the day at this 
time was seeing his [facility] worker. He then uses the restroom goes back to bed and lays on 
his right side. Patients booties is then placed on his heels. Patient is turned Q2h this 
evening, but is noted to be turning in bed independently this evening as well. He is 
complaint with HS medications. 



S/I, H/I: Denies

A/VH: No indications of response to internal stimuli

Sleep: See sleep assessment

ADL's: With assistance

Group attendance: No group this shift.

Were meds taken: Yes

Any med S/E: None observed



Mental Status Exam



Appearance: Slow moving, showered, cropped white hair dressed in green hospital scrubs. 

Eye contact: Direct

Behavior: Engaged in limited conversation with PCT and assigned RN

Speech: Slow, soft, deliberate

Mood: Bright

Affect: Congruent to mood

Thought process: Linear

Thought Content: ADL's, turning himself in bed

Cognition: Alert to self and place

Insight: Unable to assess

Judgment: Unable to assess



Interventions



PRN's used: None



Therapeutic interventions: 1:1 assessment, task orientation; simple 1 to 2 step directions, 
positive reinforcement, swallow precautions, fall precautions, one person assist with 
ambulation, toileting, bathing, and feeding, medication administration/monitoring/education, 
routine toileting, Q15 min safety checks.



Restraints/seclusion/emergency medication: N/A



Justification of Continued Inpatient Treatment: Pt has a history of catatonic episodes, he 
is currently nonverbal and gravely disabled. He needs therapeutic support and medication 
management to provide stabilization and return to baseline level of function. If pt does not 
return to previous baseline level of functioning, he may need a higher level of care.

## 2019-09-29 VITALS — SYSTOLIC BLOOD PRESSURE: 151 MMHG | DIASTOLIC BLOOD PRESSURE: 64 MMHG

## 2019-09-29 VITALS — SYSTOLIC BLOOD PRESSURE: 120 MMHG | DIASTOLIC BLOOD PRESSURE: 60 MMHG

## 2019-09-29 RX ADMIN — Medication SCH MMU: at 07:47

## 2019-09-29 RX ADMIN — INSULIN LISPRO SCH UNITS: 100 INJECTION, SOLUTION INTRAVENOUS; SUBCUTANEOUS at 13:45

## 2019-09-29 RX ADMIN — DOCUSATE SODIUM SCH MG: 100 CAPSULE, LIQUID FILLED ORAL at 20:20

## 2019-09-29 RX ADMIN — PANTOPRAZOLE SODIUM SCH MG: 40 TABLET, DELAYED RELEASE ORAL at 07:47

## 2019-09-29 RX ADMIN — INSULIN LISPRO SCH UNITS: 100 INJECTION, SOLUTION INTRAVENOUS; SUBCUTANEOUS at 08:45

## 2019-09-29 RX ADMIN — INSULIN GLARGINE SCH UNIT: 100 INJECTION, SOLUTION SUBCUTANEOUS at 20:58

## 2019-09-29 RX ADMIN — MESALAMINE SCH GM: 1.2 TABLET, DELAYED RELEASE ORAL at 13:00

## 2019-09-29 RX ADMIN — MESALAMINE SCH GM: 1.2 TABLET, DELAYED RELEASE ORAL at 17:45

## 2019-09-29 RX ADMIN — MESALAMINE SCH GM: 1.2 TABLET, DELAYED RELEASE ORAL at 07:48

## 2019-09-29 RX ADMIN — Medication SCH MMU: at 20:20

## 2019-09-29 RX ADMIN — DIVALPROEX SODIUM SCH MG: 250 TABLET, EXTENDED RELEASE ORAL at 07:49

## 2019-09-29 RX ADMIN — Medication SCH MG: at 07:48

## 2019-09-29 RX ADMIN — Medication SCH CUP: at 12:30

## 2019-09-29 RX ADMIN — Medication SCH MCG: at 07:48

## 2019-09-29 RX ADMIN — DOCUSATE SODIUM SCH MG: 100 CAPSULE, LIQUID FILLED ORAL at 08:41

## 2019-09-29 RX ADMIN — INSULIN LISPRO SCH UNITS: 100 INJECTION, SOLUTION INTRAVENOUS; SUBCUTANEOUS at 18:14

## 2019-09-29 RX ADMIN — Medication SCH CUP: at 13:30

## 2019-09-29 RX ADMIN — THERA TABS SCH EACH: TAB at 07:48

## 2019-09-29 RX ADMIN — Medication SCH CAN: at 08:00

## 2019-09-29 NOTE — NUR
NURSING PROGRESS NOTE



Legal hold: Voluntary



Client on voluntary status for GD.



Report received from Teresita Garcia RN with use of SBAR.



Why are they here: Pt. admitted for a mental health evaluation. His staff of the [facility] residence he lives at in [place] stated Mr. Patton has been refusing to care for 
himself for the past week. According to staff, Mr. Patton has not been eating or drinking, 
and has not been taking his medications. Per staff, Mr. Patton has schizophrenia and has 
gone into catatonic states in the past associated with his diabetes. Mr. Patton was seen at 
[facility] prior to this admission and prescribed antibiotics for possible pneumonia. 



Assessment

What has happened this shift:   Patient has been up in the group room most of the day.  He 
shaved and got cleaned up this a.m.   Pts BGM has been: 110, 90, 126.  Pictures of wounds 
taken and redressed.  Pt. has had a good sense of humor today:  RN took his meds to him in 
group room and did not bring a med cup, so meds were put into his hand (2), which he 
swallowed.  When he was done he said "you forgot the med cup".  Patient is getting better 
every day as far as his mental health status and his diabetic control.  Patient is feeding 
himself 100% of meals.

S/I, H/I: Denies

A/VH: No 

Sleep: 1 nap.

ADL's: With assistance.

Group attendance:  Yes, movie group. 

Were meds taken: Yes

Any med S/E: None observed

Mental Status Exam

Appearance: Slow moving, showered, short white hair dressed in green hospital scrubs. 

Eye contact: Direct

Behavior: Engaged in limited conversation with PCT and assigned RN

Speech: Slow, soft, deliberate

Mood: Euthymic.

Affect: Bright

Thought process: Linear

Thought Content: Family, returning to [facility]

Cognition: Alert to self and place

Insight: Fair

Judgment: Fair



Interventions



PRN's used: None



Therapeutic interventions: 1:1 assessment, task orientation; simple 1 to 2 step directions, 
positive reinforcement, swallow precautions, fall precautions, one person assist with 
ambulation, toileting, bathing, and feeding, medication administration/monitoring/education, 
routine toileting, Q15 min safety checks.



Restraints/seclusion/emergency medication: N/A



Justification of Continued Inpatient Treatment: Pt has a history of catatonic episodes, he 
is currently  gravely disabled. He needs therapeutic support and medication management to 
provide stabilization and return to baseline level of function. If pt does not return to 
previous baseline level of functioning, he may need a higher level of care.

## 2019-09-29 NOTE — NUR
reassessment: Pt PO improved to % meals/ONS increased appetite per MD note. LBM 9/28. 
Will continue to monitor. 

Recommendations:

1) Continue pureed/thin/CHO controlled diet per SLP recs

2) Glucerna TID with meals

3) Routine bowel care

4) Weekly wt

-------------------------------------------------------------------------------

Addendum: 09/29/19 at 0943 by Zay Washburn RD

-------------------------------------------------------------------------------

Amended: Links added.

## 2019-09-29 NOTE — NUR
NURSING PROGRESS NOTE



Legal hold: Voluntary



Client on voluntary status for GD.



Report received from PITO Zamarripa with use of SBAR.



Why are they here: Pt. admitted for a mental health evaluation. His staff of the [facility] residence he lives at in [place] stated Mr. Patton has been refusing to care for 
himself for the past week. According to staff, Mr. Patton has not been eating or drinking, 
and has not been taking his medications. Per staff, Mr. Patton has schizophrenia and has 
gone into catatonic states in the past associated with his diabetes. Mr. Patton was seen at 
[facility] prior to this admission and prescribed antibiotics for possible pneumonia. 



Assessment

What has happened this shift:   Patient has been up in the group room most  then waked to 
his room.    Pts BGM has been: 110, 90, 126, 95.  Pictures of wounds taken and redressed by 
day shift .  Pt. has had a good sense of humor today:    Patient is getting better every day 
as far as his mental health status and his diabetic control.  Patient is feeding himself 
100% of meals. Pt was able to state some of his meds and how much Lantus he receives.

S/I, H/I: Denies

A/VH: No 

Sleep: 1 nap.

ADL's: With assistance.

Group attendance:  Yes, movie group. 

Were meds taken: Yes

Any med S/E: None observed

Mental Status Exam

Appearance: Slow moving, showered, short white hair dressed in green hospital scrubs. 

Eye contact: Direct

Behavior: Engaged in limited conversation with PCT and assigned RN

Speech: Slow, soft, deliberate

Mood: Euthymic.

Affect: Bright

Thought process: Linear

Thought Content: Family, returning to [facility]

Cognition: Alert to self and place

Insight: Fair

Judgment: Fair



Interventions



PRN's used: None



Therapeutic interventions: 1:1 assessment, task orientation; simple 1 to 2 step directions, 
positive reinforcement, swallow precautions, fall precautions, one person assist with 
ambulation, toileting, bathing, and feeding, medication administration/monitoring/education, 
routine toileting, Q15 min safety checks.



Restraints/seclusion/emergency medication: N/A



Justification of Continued Inpatient Treatment: Pt has a history of catatonic episodes, he 
is currently  gravely disabled. He needs therapeutic support and medication management to 
provide stabilization and return to baseline level of function. If pt does not return to 
previous baseline level of functioning, he may need a higher level of care.

## 2019-09-29 NOTE — NUR
NURSING PROGRESS REPORT: 



Legal hold: Voluntary



Client on voluntary status for GD.



Report received from PITO Ramsey with use of SBAR.



Why are they here: Pt. admitted for a mental health evaluation. His staff of the [facility] residence he lives at in [place] stated Mr. Patton has been refusing to care for 
himself for the past week. According to staff, Mr. Patton has not been eating or drinking, 
and has not been taking his medications. Per staff, Mr. Patton has schizophrenia and has 
gone into catatonic states in the past associated with his diabetes. Mr. Patton was seen at 
[facility] prior to this admission and prescribed antibiotics for possible pneumonia. 



Assessment



What has happened this shift:  

Patient walking to room with his FWW at change of shift. He is helped into bed and rotates 
to his right side independently. He spends some time just chatting with this writer about 
his day. He reports it was a good day and that he would still like to go home. Later he got 
up and ate a snack in the group room with his peers watching a movie. when he was done 
patient was escorted to his room he used the restroom, his wound dressings were checked 
Coccyx dressing dated 9/28 and is CDI, dressing to left heel is CDI as well. Patient then 
washed his hands, did oral care and was helped into bed. Once in bed patients feet were 
placed in boots and he was turned Q2h. Patient rotates himself in bed, and when asked will 
roll from side to side when the time comes to be turned. Patient compliant with all HS 
medications. 



S/I, H/I: Denies

A/VH: No indications of response to internal stimuli

Sleep: See sleep assessment

ADL's: With assistance/prompting

Group attendance: No group this shift.

Were meds taken: Yes

Any med S/E: None observed



Mental Status Exam



Appearance: Slow moving, showered, cropped white hair dressed in green hospital scrubs. 

Eye contact: Direct

Behavior: Engaged in conversation with PCT and assigned RN

Speech: Slow, soft, deliberate

Mood: Bright

Affect: Congruent to mood

Thought process: Linear

Thought Content: ADL's, turning himself in bed, oral care, restroom use

Cognition: Alert to self and place

Insight: Unable to assess

Judgment: Unable to assess



Interventions



PRN's used: None



Therapeutic interventions: 1:1 assessment, task orientation; simple 1 to 2 step directions, 
positive reinforcement, swallow precautions, fall precautions, one person assist with 
ambulation, toileting, bathing, and feeding, medication administration/monitoring/education, 
routine toileting, Q15 min safety checks.



Restraints/seclusion/emergency medication: N/A



Justification of Continued Inpatient Treatment: Pt has a history of catatonic episodes, he 
is currently nonverbal and gravely disabled. He needs therapeutic support and medication 
management to provide stabilization and return to baseline level of function. If pt does not 
return to previous baseline level of functioning, he may need a higher level of care.

## 2019-09-30 VITALS — SYSTOLIC BLOOD PRESSURE: 150 MMHG | DIASTOLIC BLOOD PRESSURE: 70 MMHG

## 2019-09-30 VITALS — DIASTOLIC BLOOD PRESSURE: 69 MMHG | SYSTOLIC BLOOD PRESSURE: 149 MMHG

## 2019-09-30 RX ADMIN — INSULIN LISPRO SCH UNITS: 100 INJECTION, SOLUTION INTRAVENOUS; SUBCUTANEOUS at 18:09

## 2019-09-30 RX ADMIN — MESALAMINE SCH GM: 1.2 TABLET, DELAYED RELEASE ORAL at 17:23

## 2019-09-30 RX ADMIN — DIVALPROEX SODIUM SCH MG: 250 TABLET, EXTENDED RELEASE ORAL at 07:36

## 2019-09-30 RX ADMIN — THERA TABS SCH EACH: TAB at 07:38

## 2019-09-30 RX ADMIN — Medication SCH MMU: at 20:30

## 2019-09-30 RX ADMIN — MESALAMINE SCH GM: 1.2 TABLET, DELAYED RELEASE ORAL at 12:19

## 2019-09-30 RX ADMIN — INSULIN LISPRO SCH UNITS: 100 INJECTION, SOLUTION INTRAVENOUS; SUBCUTANEOUS at 13:55

## 2019-09-30 RX ADMIN — Medication SCH MCG: at 07:37

## 2019-09-30 RX ADMIN — Medication SCH MG: at 07:37

## 2019-09-30 RX ADMIN — MESALAMINE SCH GM: 1.2 TABLET, DELAYED RELEASE ORAL at 07:29

## 2019-09-30 RX ADMIN — Medication SCH CUP: at 18:02

## 2019-09-30 RX ADMIN — DOCUSATE SODIUM SCH MG: 100 CAPSULE, LIQUID FILLED ORAL at 07:39

## 2019-09-30 RX ADMIN — PANTOPRAZOLE SODIUM SCH MG: 40 TABLET, DELAYED RELEASE ORAL at 07:37

## 2019-09-30 RX ADMIN — INSULIN LISPRO SCH UNITS: 100 INJECTION, SOLUTION INTRAVENOUS; SUBCUTANEOUS at 08:48

## 2019-09-30 RX ADMIN — DOCUSATE SODIUM SCH MG: 100 CAPSULE, LIQUID FILLED ORAL at 20:30

## 2019-09-30 RX ADMIN — Medication SCH MMU: at 07:37

## 2019-09-30 RX ADMIN — INSULIN GLARGINE SCH UNIT: 100 INJECTION, SOLUTION SUBCUTANEOUS at 20:45

## 2019-09-30 NOTE — NUR
NURSING PROGRESS REPORT: 



Legal hold: Voluntary



Client on voluntary status for GD.



Report received from PITO Parmar with use of SBAR.



Why are they here: Pt. admitted for a mental health evaluation. His staff of the [facility] residence he lives at in [place] stated Mr. Patton has been refusing to care for 
himself for the past week. According to staff, Mr. Patton has not been eating or drinking, 
and has not been taking his medications. Per staff, Mr. Patton has schizophrenia and has 
gone into catatonic states in the past associated with his diabetes. Mr. Patton was seen at 
[facility] prior to this admission and prescribed antibiotics for possible pneumonia. 



Assessment

What has happened this shift: 

Sleeping soundly at change of shift. Awakened and assisted to the bathroom. Voided large 
amount. Engaged easily with staff. Smiling easily as staff made silly conversation with him. 
 Medication compliant. Patient ambulated to community room with use of his walker and stand 
by assist. He was able to feed himself and consumed 100% of breakfast. Mood and affect 
marked improved since admission. Awake and alert. Able to tell staff when he is tired and 
needs to rest. Took himself to the bathroom for a large bowel movement. 

After lunch stated he was tired and needed to go to my bed. Slept soundly throughout the 
afternoon. 



S/I, H/I: Denies

A/VH: No indications of response to internal stimuli

Sleep: 7.75

ADL's: With assistance (no shower today)

Group attendance: No 

Were meds taken: Yes

Any med S/E: None observed

Mental Status Exam

Appearance: Slow moving, showered, cropped white hair dressed in green hospital scrubs. 

Eye contact: Direct

Behavior: Engaged in limited conversation with PCT and assigned RN

Speech: Slow, soft, deliberate

Mood: Bright

Affect: Congruent to mood

Thought process: Linear

Thought Content: Family, returning to [facility], new orders for BG treatment

Cognition: Alert to self and place

Insight: Fair

Judgment: Fair



Interventions



PRN's used: Lyrica



Therapeutic interventions: 1:1 assessment, task orientation; simple 1 to 2 step directions, 
positive reinforcement, swallow precautions, fall precautions, one person assist with 
ambulation, toileting, bathing, and feeding, medication administration/monitoring/education, 
routine toileting, Q15 min safety checks.



Restraints/seclusion/emergency medication: N/A



Justification of Continued Inpatient Treatment: Pt has a history of catatonic episodes, he 
is currently nonverbal and gravely disabled. He needs therapeutic support and medication 
management to provide stabilization and return to baseline level of function. If pt does not 
return to previous baseline level of functioning, he may need a higher level of care.

## 2019-09-30 NOTE — NUR
NURSING PROGRESS REPORT: 



Legal hold: Voluntary



Client on voluntary status for GD.



Report received from Nithin RN with use of SBAR.



Why are they here: Pt. admitted for a mental health evaluation. His staff of the [facility] residence he lives at in [place] stated Mr. Patton has been refusing to care for 
himself for the past week. According to staff, Mr. Patton has not been eating or drinking, 
and has not been taking his medications. Per staff, Mr. Patton has schizophrenia and has 
gone into catatonic states in the past associated with his diabetes. Mr. Patton was seen at 
[facility] prior to this admission and prescribed antibiotics for possible pneumonia. 



Assessment

What has happened this shift: 



Pt ambulating independently in ordonez using a  FWW at start of shift.  Pt irritable at start 
of shift.  Says he wants to go home.  Sister and nephew visited pt became cheerful and 
cooperative rest of shit.  Sister is talking pts previous caregivers and trying to arrange 
pt returning to previous living situation.  



Pt has improved dramatically he is able to perform ADLs with minimal assist.  Pt continent 
of Bowel and bladder.  Excused himself from group room to use bathroom completely 
independently.  Eating 100% of meals.  HS blood sugar 113.  Drsing to sacrum and heel CD+I



S/I, H/I: Denies

A/VH: No indications of response to internal stimuli

Sleep: Asleep at this time

ADL's: With assistance (no shower today)

Group attendance: No 

Were meds taken: Yes

Any med S/E: None observed

Mental Status Exam

Appearance: Slow moving, showered, cropped white hair dressed in green hospital scrubs. 

Eye contact: Direct

Behavior: Engaged in limited conversation with PCT and assigned RN

Speech: Slow, soft, deliberate

Mood: Bright

Affect: Congruent to mood

Thought process: Linear

Thought Content: Family, returning to [facility], new orders for BG treatment

Cognition: Alert to self and place

Insight: Fair

Judgment: Fair



Interventions



PRN's used: none



Therapeutic interventions: 1:1 assessment, task orientation; simple 1 to 2 step directions, 
positive reinforcement, swallow precautions, fall precautions, one person assist with 
ambulation, toileting, bathing, and feeding, medication administration/monitoring/education, 
routine toileting, Q15 min safety checks.



Restraints/seclusion/emergency medication: N/A



Justification of Continued Inpatient Treatment: Pt has a history of catatonic episodes, he 
is currently nonverbal and gravely disabled. He needs therapeutic support and medication 
management to provide stabilization and return to baseline level of function. If pt does not 
return to previous baseline level of functioning, he may need a higher level of care.

## 2019-10-01 VITALS — SYSTOLIC BLOOD PRESSURE: 148 MMHG | DIASTOLIC BLOOD PRESSURE: 61 MMHG

## 2019-10-01 VITALS — SYSTOLIC BLOOD PRESSURE: 182 MMHG | DIASTOLIC BLOOD PRESSURE: 71 MMHG

## 2019-10-01 VITALS — SYSTOLIC BLOOD PRESSURE: 135 MMHG | DIASTOLIC BLOOD PRESSURE: 71 MMHG

## 2019-10-01 RX ADMIN — INSULIN LISPRO SCH UNITS: 100 INJECTION, SOLUTION INTRAVENOUS; SUBCUTANEOUS at 18:03

## 2019-10-01 RX ADMIN — Medication SCH CUP: at 12:38

## 2019-10-01 RX ADMIN — INSULIN LISPRO SCH UNITS: 100 INJECTION, SOLUTION INTRAVENOUS; SUBCUTANEOUS at 13:18

## 2019-10-01 RX ADMIN — MESALAMINE SCH GM: 1.2 TABLET, DELAYED RELEASE ORAL at 07:45

## 2019-10-01 RX ADMIN — INSULIN GLARGINE SCH UNIT: 100 INJECTION, SOLUTION SUBCUTANEOUS at 20:44

## 2019-10-01 RX ADMIN — INSULIN LISPRO SCH UNITS: 100 INJECTION, SOLUTION INTRAVENOUS; SUBCUTANEOUS at 08:27

## 2019-10-01 RX ADMIN — Medication SCH CUP: at 18:18

## 2019-10-01 RX ADMIN — MESALAMINE SCH GM: 1.2 TABLET, DELAYED RELEASE ORAL at 12:00

## 2019-10-01 RX ADMIN — THERA TABS SCH EACH: TAB at 07:48

## 2019-10-01 RX ADMIN — PANTOPRAZOLE SODIUM SCH MG: 40 TABLET, DELAYED RELEASE ORAL at 07:47

## 2019-10-01 RX ADMIN — DOCUSATE SODIUM SCH MG: 100 CAPSULE, LIQUID FILLED ORAL at 20:41

## 2019-10-01 RX ADMIN — Medication SCH MCG: at 07:49

## 2019-10-01 RX ADMIN — DOCUSATE SODIUM SCH MG: 100 CAPSULE, LIQUID FILLED ORAL at 07:46

## 2019-10-01 RX ADMIN — MESALAMINE SCH GM: 1.2 TABLET, DELAYED RELEASE ORAL at 17:04

## 2019-10-01 RX ADMIN — Medication SCH MG: at 07:46

## 2019-10-01 RX ADMIN — DIVALPROEX SODIUM SCH MG: 250 TABLET, EXTENDED RELEASE ORAL at 07:48

## 2019-10-01 RX ADMIN — Medication SCH MMU: at 20:41

## 2019-10-01 RX ADMIN — Medication SCH MMU: at 07:46

## 2019-10-01 NOTE — NUR
PRESSURE ULCER EDUCATION:



DEFINITION:  A pressure ulcer is an area of skin that breaks down when you stay in one 
position too long. The constant pressure against the skin reduces the blood flow to that 
area and the affected tissue dies.



CAUSES:

"Being bedridden or in a wheelchair

"Fragile skin

"Having a chronic condition, such as diabetes or vascular disease

"Inability to move certain parts of your body without assistance

"Older age

"Incontinence of urine or stool



SYMPTOMS:

"A reddened area that DOES NOT turn white when pressed on - this can be the beginning of a 
pressure ulcer

"A blister, deep sore or a crater - these can be advanced pressure ulcers



FIRST AID:

"Relieve the pressure on this area

"Keep the area clean and dry

"Call your primary doctor if you see any of the above symptoms

"DO NOT massage the area

"DO NOT use a donut shaped or ring shaped pillow- these actually interfere with the blood 
flow and cause complications



PREVENTION:

"Check for pressure ulcers everyday

"Change position at least every two hours to relieve pressure

"Use items that help relieve pressure- pillows, sheepskin, foam padding, and powders.

"Keep skin clean and dry

"Eat healthy well balanced meals

"Exercise daily



IF YOU SEE ANY OF THESE SYMPTOMS WHILE IN THE HOSPITAL - TELL YOUR NURSE IMMEDIATELY.



IF YOU SEE ANY OF THESE SYMPTOMS WHILE AT HOME OR HAVE ANY QUESTIONS OR CONCERNS  ABOUT 
PRESSURE ULCERS - CALL YOUR PRIMARY DOCTOR IMMEDIATELY.


-------------------------------------------------------------------------------

Addendum: 10/01/19 at 1416 by Aline Uriarte RN

-------------------------------------------------------------------------------

Amended: Links added.

## 2019-10-01 NOTE — NUR
NURSING PROGRESS REPORT: Eliot



Legal hold: Voluntary



Client on voluntary status for GD.



Report received from PITO Rivas with use of SBAR.



Why are they here: Pt. admitted for a mental health evaluation. His staff of the [facility] residence he lives at in [place] stated Mr. Patton has been refusing to care for 
himself for the past week. According to staff, Mr. Patton has not been eating or drinking, 
and has not been taking his medications. Per staff, Mr. Patton has schizophrenia and has 
gone into catatonic states in the past associated with his diabetes. Mr. Patton was seen at 
[facility] prior to this admission and prescribed antibiotics for possible pneumonia. 



Assessment

What has happened this shift: 

Pt was walking in the ordonez at change of shift. Pt reports he had a good day, he is talkative 
with bright affect. Pt is happy he is going to be leaving soon. Pt spent evening visiting 
with his twin sister tonight. Pt reports his appetite is good, HS BG was 123. Pt requests to 
not wear his boots while sleeping stating he is up walking around more and heels are feeling 
better. 



S/I, H/I: Denies

A/VH: No indications of response to internal stimuli

Sleep: sleeps well at night. 

ADL's: With assistance 

Group attendance: No 

Were meds taken: Yes

Any med S/E: None observed



Mental Status Exam

Appearance: Pt is clean and neat wearing hospital scrubs and a sweater

Eye contact: Direct

Behavior: pt is smiling engages with staff and his family during visitation

Speech: Slow, soft, deliberate

Mood: Bright

Affect: Congruent to mood

Thought process: Linear

Thought Content: Family, returning to [facility], 

Cognition: a/o x4

Insight: Fair

Judgment: Fair



Interventions



PRN's used: none



Therapeutic interventions: 1:1 assessment, task orientation; simple 1 to 2 step directions, 
positive reinforcement, swallow precautions, fall precautions, one person assist with 
ambulation, toileting, bathing, and feeding, medication administration/monitoring/education, 
routine toileting, Q15 min safety checks.



Restraints/seclusion/emergency medication: N/A



Justification of Continued Inpatient Treatment: Pt has a history of catatonic episodes, he 
is currently nonverbal and gravely disabled. He needs therapeutic support and medication 
management to provide stabilization and return to baseline level of function. If pt does not 
return to previous baseline level of functioning, he may need a higher level of care. Pt with throat pain, has called EMS 2 times in 24 hrs, given Pen G at [facility] last night.

## 2019-10-01 NOTE — NUR
NURSING PROGRESS REPORT: Eliot



Legal hold: Voluntary



Client on voluntary status for GD.



Report received from Nithin RN with use of SBAR.



Why are they here: Pt. admitted for a mental health evaluation. His staff of the [facility] residence he lives at in [place] stated Mr. Patton has been refusing to care for 
himself for the past week. According to staff, Mr. Patton has not been eating or drinking, 
and has not been taking his medications. Per staff, Mr. Patton has schizophrenia and has 
gone into catatonic states in the past associated with his diabetes. Mr. Patton was seen at 
[facility] prior to this admission and prescribed antibiotics for possible pneumonia. 



Assessment

What has happened this shift: 



Initially received client who was in bed with eyes closed. No somatic complaints during 
assessment and was compliant with medications. BS this am was 161 and appropriate coverage 
was given per orders. Client had a visit from his sister and her  and ambulated in 
halls with their assistance. Client had a visit from [facility] representative and it appears 
as though they will accept client this Thursday. Client participated in meeting and was able 
to support his argument for discharge to his home. No signs of hyper or hypo glycemia noted 
this shift.



S/I, H/I: Denies

A/VH: No indications of response to internal stimuli

Sleep: 

ADL's: With assistance (no shower today)

Group attendance: No 

Were meds taken: Yes

Any med S/E: None observed

Mental Status Exam

Appearance: Slow moving, showered, cropped white hair dressed in green hospital scrubs. 

Eye contact: Direct

Behavior: Engaged in limited conversation with PCT and assigned RN

Speech: Slow, soft, deliberate

Mood: Bright

Affect: Congruent to mood

Thought process: Linear

Thought Content: Family, returning to [facility], new orders for BG treatment

Cognition: Alert to self and place

Insight: Fair

Judgment: Fair



Interventions



PRN's used: none



Therapeutic interventions: 1:1 assessment, task orientation; simple 1 to 2 step directions, 
positive reinforcement, swallow precautions, fall precautions, one person assist with 
ambulation, toileting, bathing, and feeding, medication administration/monitoring/education, 
routine toileting, Q15 min safety checks.



Restraints/seclusion/emergency medication: N/A



Justification of Continued Inpatient Treatment: Pt has a history of catatonic episodes, he 
is currently nonverbal and gravely disabled. He needs therapeutic support and medication 
management to provide stabilization and return to baseline level of function. If pt does not 
return to previous baseline level of functioning, he may need a higher level of care.

## 2019-10-02 VITALS — SYSTOLIC BLOOD PRESSURE: 117 MMHG | DIASTOLIC BLOOD PRESSURE: 56 MMHG

## 2019-10-02 VITALS — DIASTOLIC BLOOD PRESSURE: 76 MMHG | SYSTOLIC BLOOD PRESSURE: 154 MMHG

## 2019-10-02 RX ADMIN — INSULIN LISPRO SCH UNITS: 100 INJECTION, SOLUTION INTRAVENOUS; SUBCUTANEOUS at 18:11

## 2019-10-02 RX ADMIN — Medication SCH MCG: at 08:08

## 2019-10-02 RX ADMIN — DOCUSATE SODIUM SCH MG: 100 CAPSULE, LIQUID FILLED ORAL at 20:13

## 2019-10-02 RX ADMIN — THERA TABS SCH EACH: TAB at 08:08

## 2019-10-02 RX ADMIN — INSULIN LISPRO SCH UNITS: 100 INJECTION, SOLUTION INTRAVENOUS; SUBCUTANEOUS at 08:40

## 2019-10-02 RX ADMIN — Medication SCH CUP: at 17:38

## 2019-10-02 RX ADMIN — MESALAMINE SCH GM: 1.2 TABLET, DELAYED RELEASE ORAL at 12:59

## 2019-10-02 RX ADMIN — PANTOPRAZOLE SODIUM SCH MG: 40 TABLET, DELAYED RELEASE ORAL at 08:08

## 2019-10-02 RX ADMIN — MESALAMINE SCH GM: 1.2 TABLET, DELAYED RELEASE ORAL at 17:37

## 2019-10-02 RX ADMIN — MESALAMINE SCH GM: 1.2 TABLET, DELAYED RELEASE ORAL at 08:08

## 2019-10-02 RX ADMIN — DOCUSATE SODIUM SCH MG: 100 CAPSULE, LIQUID FILLED ORAL at 08:08

## 2019-10-02 RX ADMIN — INSULIN GLARGINE SCH UNIT: 100 INJECTION, SOLUTION SUBCUTANEOUS at 20:16

## 2019-10-02 RX ADMIN — DIVALPROEX SODIUM SCH MG: 250 TABLET, EXTENDED RELEASE ORAL at 08:11

## 2019-10-02 RX ADMIN — INSULIN LISPRO SCH UNITS: 100 INJECTION, SOLUTION INTRAVENOUS; SUBCUTANEOUS at 13:35

## 2019-10-02 RX ADMIN — Medication SCH MG: at 08:09

## 2019-10-02 RX ADMIN — Medication SCH MMU: at 08:08

## 2019-10-02 RX ADMIN — Medication SCH CUP: at 13:06

## 2019-10-02 RX ADMIN — Medication SCH MMU: at 20:13

## 2019-10-02 NOTE — NUR
NURSING PROGRESS REPORT



Legal hold: Voluntary



Client on voluntary status for GD.



Report received from PITO Locke with use of SBAR.



Why are they here: Pt. admitted for a mental health evaluation. His staff of the [facility] residence he lives at in [place] stated Mr. Patton has been refusing to care for 
himself for the past week. According to staff, Mr. Patton has not been eating or drinking, 
and has not been taking his medications. Per staff, Mr. Patton has schizophrenia and has 
gone into catatonic states in the past associated with his diabetes. Mr. Patton was seen at 
[facility] prior to this admission and prescribed antibiotics for possible pneumonia. 



Assessment

What has happened this shift: 



The patient was asleep at change of shift.  He was up to breakfast and in a happy mood, 
smiling and interacting with others. He remains a brittle diabetic but was maintained on the 
hyperglycemic protocol. He is eating 100% of his meals and supplements. He is moving his 
bowels and is able to ambulate freely on his own steadily with his walker. He has no 
suicidal thoughts and no hallucinations. States he is looking forward to going home 
tomorrow. 



S/I, H/I: Denies

A/VH: No indications of response to internal stimuli

Sleep: None

ADL's: With assistance 

Group attendance: yes 

Were meds taken: Yes

Any med S/E: None observed



Mental Status Exam



Appearance: Good, clean, dressed in scrubs and a sweater 

Eye contact: Direct

Behavior: calm and interacting with others

Speech: talkative

Mood: Bright

Affect: Congruent to mood

Thought process: Linear

Thought Content: Family, returning to [facility]

Cognition: Alert to self and place

Insight: Fair

Judgment: Fair



Interventions



PRN's used: none



Therapeutic interventions: 1:1 assessment, task orientation; simple 1 to 2 step directions, 
positive reinforcement, swallow precautions, fall precautions, one person assist with 
ambulation, toileting, bathing, and feeding, medication administration/monitoring/education, 
routine toileting, Q15 min safety checks.



Restraints/seclusion/emergency medication: N/A



Justification of Continued Inpatient Treatment: Pt has a history of catatonic episodes, he 
is currently nonverbal and gravely disabled. He needs therapeutic support and medication 
management to

## 2019-10-03 RX ADMIN — THERA TABS SCH EACH: TAB at 09:06

## 2019-10-03 RX ADMIN — Medication SCH CUP: at 09:07

## 2019-10-03 RX ADMIN — DOCUSATE SODIUM SCH MG: 100 CAPSULE, LIQUID FILLED ORAL at 09:06

## 2019-10-03 RX ADMIN — Medication SCH MG: at 09:06

## 2019-10-03 RX ADMIN — PANTOPRAZOLE SODIUM SCH MG: 40 TABLET, DELAYED RELEASE ORAL at 09:06

## 2019-10-03 RX ADMIN — INSULIN LISPRO SCH UNITS: 100 INJECTION, SOLUTION INTRAVENOUS; SUBCUTANEOUS at 09:28

## 2019-10-03 RX ADMIN — Medication SCH MMU: at 09:06

## 2019-10-03 RX ADMIN — Medication SCH MCG: at 09:06

## 2019-10-03 RX ADMIN — DIVALPROEX SODIUM SCH MG: 250 TABLET, EXTENDED RELEASE ORAL at 09:05

## 2019-10-03 RX ADMIN — MESALAMINE SCH GM: 1.2 TABLET, DELAYED RELEASE ORAL at 09:06

## 2019-10-03 NOTE — NUR
DISCHARGE NOTE

The patient was discharged today at 1030.He was picked up by care givers from [facility]. 
He left with all instructions, prescriptions, and belongings. The patient stated he was very 
happy to be going home and was looking forward to shopping for food and getting back into 
his daily routine. He had no suicidal thoughts and no hallucinations. He was escorted to the 
lobby by ELIZABETH Gilbert.

## 2020-02-14 ENCOUNTER — HOSPITAL ENCOUNTER (EMERGENCY)
Dept: HOSPITAL 94 - ER | Age: 65
LOS: 1 days | Discharge: PSYCHIATRIC OR SUBSTANCE USE FACILITY | End: 2020-02-15
Payer: MEDICARE

## 2020-02-14 VITALS — BODY MASS INDEX: 21.52 KG/M2 | WEIGHT: 145.31 LBS | HEIGHT: 69 IN

## 2020-02-14 DIAGNOSIS — Z79.899: ICD-10-CM

## 2020-02-14 DIAGNOSIS — E11.9: ICD-10-CM

## 2020-02-14 DIAGNOSIS — Z79.4: ICD-10-CM

## 2020-02-14 DIAGNOSIS — F20.2: Primary | ICD-10-CM

## 2020-02-14 DIAGNOSIS — Z79.82: ICD-10-CM

## 2020-02-14 DIAGNOSIS — Z88.8: ICD-10-CM

## 2020-02-14 LAB
ALBUMIN SERPL BCP-MCNC: 2.5 G/DL (ref 3.4–5)
ALBUMIN/GLOB SERPL: 0.8 {RATIO} (ref 1.1–1.5)
ALP SERPL-CCNC: 122 IU/L (ref 46–116)
ALT SERPL W P-5'-P-CCNC: 62 U/L (ref 12–78)
AMPHETAMINES UR QL SCN: NEGATIVE
ANION GAP SERPL CALCULATED.3IONS-SCNC: 4 MMOL/L (ref 8–16)
APAP SERPL-MCNC: < 2 UG/ML (ref 10–30)
AST SERPL W P-5'-P-CCNC: 41 U/L (ref 10–37)
BACTERIA URNS QL MICRO: (no result) /HPF
BARBITURATES UR QL SCN: NEGATIVE
BASOPHILS # BLD AUTO: 0 X10'3 (ref 0–0.2)
BASOPHILS NFR BLD AUTO: 0.6 % (ref 0–1)
BENZODIAZ UR QL SCN: NEGATIVE
BILIRUB SERPL-MCNC: 0.3 MG/DL (ref 0.1–1)
BUN SERPL-MCNC: 19 MG/DL (ref 7–18)
BUN/CREAT SERPL: 20.7 (ref 5.4–32)
BZE UR QL SCN: NEGATIVE
CALCIUM SERPL-MCNC: 8.3 MG/DL (ref 8.5–10.1)
CANNABINOIDS UR QL SCN: NEGATIVE
CHLORIDE SERPL-SCNC: 106 MMOL/L (ref 99–107)
CLARITY UR: CLEAR
CO2 SERPL-SCNC: 29.9 MMOL/L (ref 24–32)
COLOR UR: YELLOW
CREAT SERPL-MCNC: 0.92 MG/DL (ref 0.6–1.1)
DEPRECATED SQUAMOUS URNS QL MICRO: (no result) /LPF
EOSINOPHIL # BLD AUTO: 0.2 X10'3 (ref 0–0.9)
EOSINOPHIL NFR BLD AUTO: 2.8 % (ref 0–6)
ERYTHROCYTE [DISTWIDTH] IN BLOOD BY AUTOMATED COUNT: 16.5 % (ref 11.5–14.5)
ETHANOL SERPL-MCNC: < 0.01 GM/DL (ref 0–0.01)
GFR SERPL CREATININE-BSD FRML MDRD: 83 ML/MIN
GLUCOSE SERPL-MCNC: 237 MG/DL (ref 70–104)
GLUCOSE UR STRIP-MCNC: NEGATIVE MG/DL
HCT VFR BLD AUTO: 49.8 % (ref 42–52)
HGB BLD-MCNC: 16.4 G/DL (ref 14–17.9)
HGB UR QL STRIP: NEGATIVE
KETONES UR STRIP-MCNC: NEGATIVE MG/DL
LEUKOCYTE ESTERASE UR QL STRIP: NEGATIVE
LYMPHOCYTES # BLD AUTO: 1.3 X10'3 (ref 1.1–4.8)
LYMPHOCYTES NFR BLD AUTO: 15.8 % (ref 21–51)
MCH RBC QN AUTO: 28.4 PG (ref 27–31)
MCHC RBC AUTO-ENTMCNC: 33.1 G/DL (ref 33–36.5)
MCV RBC AUTO: 85.9 FL (ref 78–98)
METHADONE UR QL SCN: NEGATIVE
MONOCYTES # BLD AUTO: 0.7 X10'3 (ref 0–0.9)
MONOCYTES NFR BLD AUTO: 9.4 % (ref 2–12)
NEUTROPHILS # BLD AUTO: 5.7 X10'3 (ref 1.8–7.7)
NEUTROPHILS NFR BLD AUTO: 71.4 % (ref 42–75)
NITRITE UR QL STRIP: NEGATIVE
OPIATES UR QL SCN: NEGATIVE
PCP UR QL SCN: NEGATIVE
PH UR STRIP: 8 [PH] (ref 4.8–8)
PLATELET # BLD AUTO: 152 X10'3 (ref 140–440)
PMV BLD AUTO: 9.3 FL (ref 7.4–10.4)
POTASSIUM SERPL-SCNC: 4.6 MMOL/L (ref 3.5–5.1)
PROT SERPL-MCNC: 5.5 G/DL (ref 6.4–8.2)
PROT UR QL STRIP: 30 MG/DL
RBC # BLD AUTO: 5.79 X10'6 (ref 4.7–6.1)
RBC #/AREA URNS HPF: (no result) /HPF (ref 0–2)
SODIUM SERPL-SCNC: 140 MMOL/L (ref 135–145)
SP GR UR STRIP: 1.02 (ref 1–1.03)
URN COLLECT METHOD CLASS: (no result)
UROBILINOGEN UR STRIP-MCNC: 0.2 E.U/DL (ref 0.2–1)
VALPROATE SERPL-MCNC: 6 UG/ML (ref 50–100)
WBC # BLD AUTO: 7.9 X10'3 (ref 4.5–11)
WBC #/AREA URNS HPF: (no result) /HPF (ref 0–4)

## 2020-02-14 PROCEDURE — 96372 THER/PROPH/DIAG INJ SC/IM: CPT

## 2020-02-14 PROCEDURE — 80164 ASSAY DIPROPYLACETIC ACD TOT: CPT

## 2020-02-14 PROCEDURE — 99285 EMERGENCY DEPT VISIT HI MDM: CPT

## 2020-02-14 PROCEDURE — 80305 DRUG TEST PRSMV DIR OPT OBS: CPT

## 2020-02-14 PROCEDURE — 83605 ASSAY OF LACTIC ACID: CPT

## 2020-02-14 PROCEDURE — 82948 REAGENT STRIP/BLOOD GLUCOSE: CPT

## 2020-02-14 PROCEDURE — 94640 AIRWAY INHALATION TREATMENT: CPT

## 2020-02-14 PROCEDURE — 80178 ASSAY OF LITHIUM: CPT

## 2020-02-14 PROCEDURE — 36415 COLL VENOUS BLD VENIPUNCTURE: CPT

## 2020-02-14 PROCEDURE — 84145 PROCALCITONIN (PCT): CPT

## 2020-02-14 PROCEDURE — 84443 ASSAY THYROID STIM HORMONE: CPT

## 2020-02-14 PROCEDURE — 80320 DRUG SCREEN QUANTALCOHOLS: CPT

## 2020-02-14 PROCEDURE — 85025 COMPLETE CBC W/AUTO DIFF WBC: CPT

## 2020-02-14 PROCEDURE — 94760 N-INVAS EAR/PLS OXIMETRY 1: CPT

## 2020-02-14 PROCEDURE — 70450 CT HEAD/BRAIN W/O DYE: CPT

## 2020-02-14 PROCEDURE — 87040 BLOOD CULTURE FOR BACTERIA: CPT

## 2020-02-14 PROCEDURE — 80329 ANALGESICS NON-OPIOID 1 OR 2: CPT

## 2020-02-14 PROCEDURE — 71045 X-RAY EXAM CHEST 1 VIEW: CPT

## 2020-02-14 PROCEDURE — 80053 COMPREHEN METABOLIC PANEL: CPT

## 2020-02-14 PROCEDURE — 81001 URINALYSIS AUTO W/SCOPE: CPT

## 2020-02-14 NOTE — NUR
Chart faxed to [facility] office. [facility] Staff will not evaluate the patient 
until tomorrow am. Pt's caregiver from [facility] is in the room with the patient 
at this time. Script sent as per  Med list updated as per  Will await pts call back to inform pt as per

## 2020-02-14 NOTE — NUR
Physical Therapy Evaluation      Visit Count: 1  Plan of Care Dates: Initial: 6/30/2017 Through: 9/22/2017    Insurance Information: PAYOR: [employer]  VISIT LIMIT: 35 VISITS PER CALENDAR YEAR - COMBINED PT/OT  AUTHORIZATION NEEDED: ONLY FOR SPEECH      DEDUCTIBLE: $450.00                 MET: $YES  OUT OF POCKET: $  2250.00      MET: $476.52  COINSURANCE: 20%  COPAY: $0  REF #: [id number] [facility]  THIS QUOTE OF BENEFITS IS NOT A GUARANTEE OF PAYMENT  Next Referring Provider Visit: none    Referred by: ISAI Monroe  Medical Diagnosis (from order):  724.4 (ICD-9-CM) - M54.10 (ICD-10-CM) - Radicular low back pain  Treatment Diagnosis: Back Symptoms with Pain, Impaired Posture, Radiating Pain and Impaired Mobility  Insurance: 1. [employer]  2. 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 Walking throughout the day decreases pain. Takes 3 Advil for pain. About a month ago the symptoms resolved but then have since returned. Squatting down and picking something up triggers the pain. Bottom of leg can sometimes become numb and top of foot hurt after walking a lot    Pain:  Intensity: Now: 2-3/10; Best: 1-2/10; Worst: 9/10 (in the last 2 weeks)  Location: posterior right thigh  Quality/Description: Sharp, Shooting, Numbness, Tingling  Relieving/Alleviating factors: over the counter medication, avoiding movement in the involved area  Pain Frequency: Constant  Pain Cycle: Pain worse in AM  Radiating Pain: to front of lower leg and foot   Function:  Limitations and exacerbating factors (patient reported): pain, difficulty with age appropriate activities  Prior level (patient reported): independent with all activities of daily living and  No change in condition. Continuing to monitor. instrumental activites of daily living    Prior Treatment: no therapies in the past year for current condition. Hospitalization, home health services or skilled nursing facility in the last 30 days: No, per patient.    Home Environment/Social Support: Patient lives with significant other.  Patient has no assistance from family/friends.      Safety:  Do you feel safe at home, work and/or school? yes, per patient  Falls: balance history in last year (< or equal to 2 falls/near falls and/or reasons) does not indicate further testing    Patient Goals/Concerns:  Decrease pain levels     OBJECTIVE   Posture/Observation:  -Overweight, increased lumbar lordosis   -Increase in pain/symptoms with prone on elbows positioning     Gait Analysis:  Bilateral LE rotation, decreased stance time on RLE   Range of Motion (%) Initial   Lumbar Flexion 100   Lumbar Extension 100   Lumbar Lateral Flexion Left 100   Lumbar Lateral Flexion Right  100*   Lumbar Rotation Left     Lumbar Rotation Right     standard testing positions unless otherwise noted; Key: ranges are reported in active range of motion unless noted as AA=active assistive or P=passive range of motion, * denotes pain   Comments: Only those motions that were assessed are noted.    Strength (out of 5) Level     Date  Initial Initial     Left Right   Hip Flexion L2-3 5/5 5/5   Hip Extension L4-5     Hip Abduction L4-5 4+/5 4+/5   Hip Adduction L2-3     Hip External Rotation L4-5     Hip Internal Rotation L2-3     Knee Extension L3-4 5/5 5/5   Knee Flexion L5-S1 5/5 5/5   Ankle Plantar Flexion  S1-2     Ankle Dorsiflexion  L4-5 5/5 5/5   Ankle Inversion L4     Ankle Eversion L5-S1     Extensor Hallucis Longus L5     standard testing positions unless otherwise noted,* denotes pain  Comments: Only muscle strength that was assessed are noted.      Muscle Flexibility:  Hamstring - Left: moderate restriction, Right: moderate restriction  Hip External Rotators - Left: minimal  restriction, Right: moderate restriction  Iliotibial Band - Right: moderate tightness  Rectus Femoris - Right: moderate tightness    Joint Play Assessment:  Hip mobility WNL    Palpation:  -Increased tone and tenderness to palpation of right piriformis and IT band     Special Tests:  Passive Straight Leg Raise Test: Positive right  for nerve root impingement    Outcome Measures:   Oswestry: OSWESTRY Score Calculated: 26.67 %   (0-20% = minimal disability; 20-40% = moderate disability; 40-60% = severe disability; 60-80% = crippled; % = bed bound)     Initial Treatment   Initial evaluation completed.    Therapeutic Exercise:   -Supine double knee to chest lumbar stretch x 30 seconds. Patient reported feeling relief from this position/stretch  -LAURA piriformis stretch RLE x 30 seconds   -Hooklying abdominal brace 5 second holds x 5. Required moderate cueing to elicit core contraction    Therapeutic Activity:  -Discussed plan of care with patient and educated on findings. Also educated patient on lumbar spine anatomy and importance of obtaining neutral spine position via core stabilization     Initial Home Program:  * above denotes home program issuance as well  Supine double knee to chest lumbar stretch  Supine LAURA piriformis stretch  Hooklying abdominal brace     Plan for next session:   -Review HEP  -Progress core stabilization- bent knee fall out, hip flexion, glut bridge, SLR  -Manual therapy/US PRN  -Hamstring stretch  -ADL/Lifting mechanics    ASSESSMENT   63 year old male presents to therapy with significant decline in prior level of function due to signs and symptoms consistent with lumbar radiculopathy secondary to overuse of lumbar extension pattern. Flexion of lumbar spine appears to decrease symptoms while extension increases symptoms. Possible piriformis involvement compression lumbar spinal nerves.     Outcome:    Benefit from skilled therapy: yes   Rehab potential is good due to positive factors  age, motivation level, recent onset and negative factors not apparent at this time    Predicted patient presentation: stable and/or uncomplicated characteristics   Plan of care to decrease pain, improve muscle coordination, improve activity tolerance, improve quality of gait, improve activities of daily living and instrumental activites of daily living, improve body mechanics to address functional limitations listed above.    Goals:  To be obtained by end of this plan of care:  1. Patient independent with modified and progressed home exercise program.  2. Patient will report decreased lumbar pain/symptoms to 2/10 to aid in age appropriate activities.   3. Patient will demonstrate proper body mechanics for sitting and standing.  4. Patient will decrease radicular symptoms by 75 % to aid in lifting as required.  5. Oswestry: Patient will complete form to reflect an improved score from initial score of 27% to less than or equal to 15% (0-20% = minimal disability; 20-40% = moderate disability; 40-60% = severe disability; 60-80% = crippled; % = bed bound) to indicate pt reported improvement in function/disability/impairment (minimal detectable change: 12%).     PLAN   Frequency/Duration: 2 times per week for 12 weeks with tapering as the patient progresses  Skilled training and instruction for the following interventions:  Gait Training (96313)  Manual Therapy (34448)  Neuromuscular Re-Education (76645)  Therapeutic Activity (26957)  Therapeutic Exercise (75868)  Electrical Stimulation (78954//62484)   Heat/Cold (43802)  Mechanical Traction (49819)  Ultrasound/Phonophoresis (01271)    The plan of care and goals were established with the patient who concurs.  Patient has been given attendance policy at time of initial evaluation.    Patient Education:  Who will be receiving education: patient  Are they ready to learn: yes  Preferred learning style: written, verbal, demonstration  Barriers to learning: no  barriers apparent at this time   Result of initial outlined education: Verbalizes understanding, Demonstrates understanding and Needs reinforcement    THERAPY DAILY BILLING   Primary Insurance: [employer]  Secondary Insurance: N/A    Evaluation Procedures:  Physical Therapy Evaluation: Low Complexity    Timed Procedures:  Therapeutic Activity, 8 minutes  Therapeutic Exercise, 10 minutes    Untimed Procedures:  No untimed codes were used on this date of service    Total Treatment Time: 45 minutes    Care approved by and performed under the direction of therapist.  Student's note read and approved.   Rik Richards, PT

## 2020-02-15 VITALS — SYSTOLIC BLOOD PRESSURE: 151 MMHG | DIASTOLIC BLOOD PRESSURE: 69 MMHG

## 2020-02-15 LAB
ALBUMIN SERPL BCP-MCNC: 2.6 G/DL (ref 3.4–5)
ALBUMIN/GLOB SERPL: 0.9 {RATIO} (ref 1.1–1.5)
ALP SERPL-CCNC: 123 IU/L (ref 46–116)
ALT SERPL W P-5'-P-CCNC: 62 U/L (ref 12–78)
ANION GAP SERPL CALCULATED.3IONS-SCNC: 6 MMOL/L (ref 8–16)
AST SERPL W P-5'-P-CCNC: 44 U/L (ref 10–37)
BASOPHILS # BLD AUTO: 0.1 X10'3 (ref 0–0.2)
BASOPHILS NFR BLD AUTO: 1 % (ref 0–1)
BILIRUB SERPL-MCNC: 0.5 MG/DL (ref 0.1–1)
BUN SERPL-MCNC: 21 MG/DL (ref 7–18)
BUN/CREAT SERPL: 20.8 (ref 5.4–32)
CALCIUM SERPL-MCNC: 8.8 MG/DL (ref 8.5–10.1)
CHLORIDE SERPL-SCNC: 108 MMOL/L (ref 99–107)
CO2 SERPL-SCNC: 27 MMOL/L (ref 24–32)
CREAT SERPL-MCNC: 1.01 MG/DL (ref 0.6–1.1)
EOSINOPHIL # BLD AUTO: 0.2 X10'3 (ref 0–0.9)
EOSINOPHIL NFR BLD AUTO: 2.1 % (ref 0–6)
ERYTHROCYTE [DISTWIDTH] IN BLOOD BY AUTOMATED COUNT: 16.6 % (ref 11.5–14.5)
GFR SERPL CREATININE-BSD FRML MDRD: 74 ML/MIN
GLUCOSE SERPL-MCNC: 209 MG/DL (ref 70–104)
HCT VFR BLD AUTO: 49.2 % (ref 42–52)
HGB BLD-MCNC: 16.4 G/DL (ref 14–17.9)
LYMPHOCYTES # BLD AUTO: 1.6 X10'3 (ref 1.1–4.8)
LYMPHOCYTES NFR BLD AUTO: 18.7 % (ref 21–51)
MCH RBC QN AUTO: 28.5 PG (ref 27–31)
MCHC RBC AUTO-ENTMCNC: 33.4 G/DL (ref 33–36.5)
MCV RBC AUTO: 85.3 FL (ref 78–98)
MONOCYTES # BLD AUTO: 0.7 X10'3 (ref 0–0.9)
MONOCYTES NFR BLD AUTO: 8.6 % (ref 2–12)
NEUTROPHILS # BLD AUTO: 5.8 X10'3 (ref 1.8–7.7)
NEUTROPHILS NFR BLD AUTO: 69.6 % (ref 42–75)
PLATELET # BLD AUTO: 165 X10'3 (ref 140–440)
PMV BLD AUTO: 9.4 FL (ref 7.4–10.4)
POTASSIUM SERPL-SCNC: 4.5 MMOL/L (ref 3.5–5.1)
PROT SERPL-MCNC: 5.6 G/DL (ref 6.4–8.2)
RBC # BLD AUTO: 5.77 X10'6 (ref 4.7–6.1)
SODIUM SERPL-SCNC: 141 MMOL/L (ref 135–145)
VALPROATE SERPL-MCNC: 5 UG/ML (ref 50–100)
WBC # BLD AUTO: 8.3 X10'3 (ref 4.5–11)

## 2020-02-15 RX ADMIN — THERA TABS SCH EACH: TAB at 00:09

## 2020-02-15 RX ADMIN — THERA TABS SCH EACH: TAB at 08:00

## 2020-02-15 NOTE — NUR
MÓNICA HANDLEY TO TALK TO PROVIDER AND UPSTAIRS AND THEN GIVE INPUT, CAREGIVER 
CONTINUE TO BE AT BEDSIDE, PT CALM

## 2020-02-15 NOTE — NUR
patient in bed covers on eyes closed rr even un labored no observable s/s of 
acute stress at this time will continue to monitor, patient in home health care 
personal at bedside

## 2020-02-15 NOTE — NUR
PATIENT IN BED SUPINE COVERS ON EYES CLOSED RR EVEN UN LABORED NO OBSERVABLE 
S/S OF ACUTE STRESS AT THIS TIME WILL CONTINUE TO MONITOR, PATIENT IN HOME 
HEALTH WORKER AT BEDSIDE ALL NIGHT PER [employer] POLICY , PROVIDED HEALTH 
CARE WORKER WITH WATER AND WARM BLANKETS

## 2020-02-15 NOTE — NUR
PT CARE GIVER REKATELIN CONSULT WITH [facility] AWAITING MÓNICA HANDLEY DECISION TO COME DOWN AND 
CONSULT OR NOT

## 2020-02-15 NOTE — NUR
patient in bed eyes closed covers on rr even un labored no observable s/s of 
acute stress at this time
